# Patient Record
Sex: MALE | Race: WHITE
[De-identification: names, ages, dates, MRNs, and addresses within clinical notes are randomized per-mention and may not be internally consistent; named-entity substitution may affect disease eponyms.]

---

## 2017-10-13 ENCOUNTER — HOSPITAL ENCOUNTER (OUTPATIENT)
Dept: HOSPITAL 47 - LABWHC1 | Age: 62
Discharge: HOME | End: 2017-10-13
Attending: FAMILY MEDICINE
Payer: COMMERCIAL

## 2017-10-13 DIAGNOSIS — E78.5: Primary | ICD-10-CM

## 2017-10-13 DIAGNOSIS — E03.9: ICD-10-CM

## 2017-10-13 LAB
ALP SERPL-CCNC: 85 U/L (ref 38–126)
ALT SERPL-CCNC: 34 U/L (ref 21–72)
ANION GAP SERPL CALC-SCNC: 12 MMOL/L
AST SERPL-CCNC: 17 U/L (ref 17–59)
BUN SERPL-SCNC: 20 MG/DL (ref 9–20)
CALCIUM SPEC-MCNC: 9.1 MG/DL (ref 8.4–10.2)
CHLORIDE SERPL-SCNC: 103 MMOL/L (ref 98–107)
CHOLEST SERPL-MCNC: 167 MG/DL (ref ?–200)
CO2 SERPL-SCNC: 30 MMOL/L (ref 22–30)
GLUCOSE SERPL-MCNC: 104 MG/DL (ref 74–99)
HDLC SERPL-MCNC: 53 MG/DL (ref 40–60)
MAGNESIUM SPEC-SCNC: 2.1 MG/DL (ref 1.6–2.3)
NON-AFRICAN AMERICAN GFR(MDRD): >60
POTASSIUM SERPL-SCNC: 4.2 MMOL/L (ref 3.5–5.1)
PROT SERPL-MCNC: 7.2 G/DL (ref 6.3–8.2)
PSA SERPL-MCNC: 3.05 NG/ML (ref 0–4)
SODIUM SERPL-SCNC: 145 MMOL/L (ref 137–145)

## 2017-10-13 PROCEDURE — 80053 COMPREHEN METABOLIC PANEL: CPT

## 2017-10-13 PROCEDURE — 80061 LIPID PANEL: CPT

## 2017-10-13 PROCEDURE — 84153 ASSAY OF PSA TOTAL: CPT

## 2017-10-13 PROCEDURE — 36415 COLL VENOUS BLD VENIPUNCTURE: CPT

## 2017-10-13 PROCEDURE — 84443 ASSAY THYROID STIM HORMONE: CPT

## 2017-10-13 PROCEDURE — 83735 ASSAY OF MAGNESIUM: CPT

## 2017-10-13 PROCEDURE — 84439 ASSAY OF FREE THYROXINE: CPT

## 2019-09-05 ENCOUNTER — HOSPITAL ENCOUNTER (OUTPATIENT)
Dept: HOSPITAL 47 - RADMRIMAIN | Age: 64
Discharge: HOME | End: 2019-09-05
Attending: OPHTHALMOLOGY
Payer: MEDICARE

## 2019-09-05 DIAGNOSIS — G31.9: Primary | ICD-10-CM

## 2019-09-05 DIAGNOSIS — I67.82: ICD-10-CM

## 2019-09-05 PROCEDURE — 70553 MRI BRAIN STEM W/O & W/DYE: CPT

## 2019-09-05 NOTE — MR
EXAMINATION TYPE: MR brain wo/w con

 

DATE OF EXAM: 9/5/2019

 

COMPARISON: MRI brain July 9, 2015.

 

HISTORY: Diplopia

 

TECHNIQUE: 

Multiplanar, multisequence images of the brain and brainstem is performed without and with IV contras
t, utilizing 10 mL intravenous Gadavist .

 

FINDINGS: Diffusion weighted images demonstrate no evidence of a recent infarct or other diffusion ab
normality.  There is no worrisome extra-axial fluid collection. There is diffuse ventricular and sulc
al prominence redemonstrated. There are some scattered foci of T2 hyperintensities again seen through
out the white matter bilaterally. Less than 10 scattered lesions are seen.

 

Midline structures redemonstrated empty sella morphology.  The craniocervical junction appears within
 normal limits.  Post contrast images demonstrate no abnormal enhancement. The dural venous sinuses a
ppear patent. The visualized sinuses are clear and the globes are intact. Nasal septum remains deviat
ed to right of midline.

 

IMPRESSION: Mild to moderate diffuse cerebral atrophy and mild chronic small vessel ischemic changes.
 No suspicious enhancement noted.

## 2021-08-22 ENCOUNTER — HOSPITAL ENCOUNTER (EMERGENCY)
Dept: HOSPITAL 47 - EC | Age: 66
Discharge: HOME | End: 2021-08-22
Payer: MEDICARE

## 2021-08-22 VITALS — RESPIRATION RATE: 18 BRPM | TEMPERATURE: 98.5 F

## 2021-08-22 VITALS — HEART RATE: 59 BPM | SYSTOLIC BLOOD PRESSURE: 173 MMHG | DIASTOLIC BLOOD PRESSURE: 93 MMHG

## 2021-08-22 DIAGNOSIS — Z79.82: ICD-10-CM
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
## 2021-08-22 DIAGNOSIS — I48.91: ICD-10-CM

## 2021-08-22 DIAGNOSIS — Z79.899: ICD-10-CM

## 2021-08-22 DIAGNOSIS — Z82.49: ICD-10-CM

## 2021-08-22 DIAGNOSIS — E03.9: ICD-10-CM

## 2021-08-22 DIAGNOSIS — Z79.1: ICD-10-CM

## 2021-08-22 DIAGNOSIS — S39.012A: Primary | ICD-10-CM

## 2021-08-22 DIAGNOSIS — Z83.3: ICD-10-CM

## 2021-08-22 DIAGNOSIS — I10: ICD-10-CM

## 2021-08-22 DIAGNOSIS — Z79.890: ICD-10-CM

## 2021-08-22 LAB
PH UR: 7 [PH] (ref 5–8)
SP GR UR: 1.02 (ref 1–1.03)
UROBILINOGEN UR QL STRIP: <2 MG/DL (ref ?–2)

## 2021-08-22 PROCEDURE — 96372 THER/PROPH/DIAG INJ SC/IM: CPT

## 2021-08-22 PROCEDURE — 99283 EMERGENCY DEPT VISIT LOW MDM: CPT

## 2021-08-22 PROCEDURE — 81003 URINALYSIS AUTO W/O SCOPE: CPT

## 2021-08-22 PROCEDURE — 72100 X-RAY EXAM L-S SPINE 2/3 VWS: CPT

## 2021-08-22 NOTE — XR
EXAMINATION TYPE: XR lumbar spine 2 or 3V

 

DATE OF EXAM: 8/22/2021

 

CLINICAL HISTORY: pain

 

TECHNIQUE: Three views of the lumbar spine are submitted.

 

COMPARISON: None.

 

FINDINGS:  

  Curvature convex to the left.  Severe multilevel degenerative disc space narrowing greatest at L5-S
1 and at T12-L1. Chronic loss of height L1. Grade 1 anterolisthesis L5 on S1 measuring 8.6 mm. Severe
 facet joint arthropathy.

 

IMPRESSION:  

Advanced degenerative changes. Grade 1 anterolisthesis L5 on S1. Chronic loss of height L1.

## 2021-08-22 NOTE — ED
General Adult HPI





- General


Chief complaint: Back Pain/Injury


Stated complaint: Back pain/burning


Time Seen by Provider: 21 14:00


Source: patient, RN notes reviewed, old records reviewed


Mode of arrival: wheelchair


Limitations: no limitations





- History of Present Illness


Initial comments: 





This is a 65-year-old male who presents emergency Department stating that on 

Friday he did a lot more work house painting and putting up shelving.  Patient 

states on Saturday morning he woke up and he was having some paraspinous pain on

the left lower back.  Patient states he has no numbness or weakness.  Patient 

denies any urinary continence urinary retention.  Patient denies any abdominal 

pain.  Patient denies any pain on the right side.  Patient states bending or 

twisting makes it worse he states if he goes in the hot tub makes it much 

better.





- Related Data


                                Home Medications











 Medication  Instructions  Recorded  Confirmed


 


Aspirin 81 mg PO DAILY 14


 


Metoprolol Succinate [Toprol XL] 50 mg PO DAILY 14


 


Simvastatin [Zocor] 20 mg PO HS 14


 


Potassium Gluconate [Potassium 99 mg PO DAILY 14





Gluconate ER]   


 


Lisinopril-Hctz 20-25 mg 1 each PO DAILY 14





[Zestoretic 20-25]   


 


Metoprolol Succinate (ER) [Toprol 25 mg PO HS 14





XL]   








                                  Previous Rx's











 Medication  Instructions  Recorded


 


Levothyroxine Sodium [Synthroid] 137 mcg PO DAILY #0 14


 


Cyclobenzaprine [Flexeril] 10 mg PO TID #20 tab 21


 


Ketorolac [Toradol] 10 mg PO Q6HR #15 tab 21











                                    Allergies











Allergy/AdvReac Type Severity Reaction Status Date / Time


 


No Known Allergies Allergy   Verified 14 18:19














Review of Systems


ROS Statement: 


Those systems with pertinent positive or pertinent negative responses have been 

documented in the HPI.





ROS Other: All systems not noted in ROS Statement are negative.





Past Medical History


Past Medical History: Atrial Fibrillation, Hypertension, Prostate Disorder, 

Thyroid Disorder


Additional Past Medical History / Comment(s): FREQUENT PVCS, hypothyroid, 

genital herpes


History of Any Multi-Drug Resistant Organisms: None Reported


Past Surgical History: Heart Catheterization With Stent, Orthopedic Surgery, 

Tonsillectomy


Additional Past Surgical History / Comment(s): knee,shoulder, and wrist


Past Anesthesia/Blood Transfusion Reactions: No Reported Reaction


Date of Last Stent Placement:: 


Past Psychological History: No Psychological Hx Reported


Past Alcohol Use History: None Reported


Past Drug Use History: None Reported





- Past Family History


  ** Mother


Family Medical History: Asthma, COPD, Coronary Artery Disease (CAD), Diabetes 

Mellitus


Additional Family Medical History / Comment(s):  at 75yrs old, polyps in

 colon, colostomy bag





  ** Father


Family Medical History: Myocardial Infarction (MI)


Additional Family Medical History / Comment(s):  with a heart attack





General Exam





- General Exam Comments


Initial Comments: 





GENERAL:


Patient is well-developed and well-nourished.  Patient is nontoxic and well-

hydrated and is in mild distress.





ENT:


Neck is soft and supple.  No significant lymphadenopathy is noted.  Oropharynx 

is clear.  Moist mucous membranes.  Neck has full range of motion without 

eliciting any pain. 





EYES:


The sclera were anicteric and conjunctiva were pink and moist.  Extraocular 

movements were intact and pupils were equal round and reactive to light.  

Eyelids were unremarkable.





ABDOMEN


Soft and nontender with normal bowel sounds. 





SKIN:


Skin is clear with no lesions or rashes and otherwise unremarkable.





NEUROLOGIC:


Patient is alert and oriented x3.  Cranial nerves II through XII are grossly 

intact.  Motor and sensory are also intact.  Normal speech, volume and content. 

 Symmetrical smile.  Straight leg test is negative bilaterally





MUSCULOSKELETAL:


Normal extremities with adequate strength and full range of motion.  Patient has

 some tenderness in the paraspinous muscles on the lower left back. 





PSYCHIATRIC:


Normal psychiatric evaluation. 


Limitations: no limitations





Course





                                   Vital Signs











  21





  12:23


 


Temperature 98.5 F


 


Pulse Rate 61


 


Respiratory 18





Rate 


 


Blood Pressure 163/80


 


O2 Sat by Pulse 97





Oximetry 














Medical Decision Making





- Medical Decision Making





Lumbosacral shows no acute injury.





- Lab Data





                                   Lab Results











  21 Range/Units





  12:34 


 


Urine Color  Yellow  


 


Urine Appearance  Clear  (Clear)  


 


Urine pH  7.0  (5.0-8.0)  


 


Ur Specific Gravity  1.017  (1.001-1.035)  


 


Urine Protein  Negative  (Negative)  


 


Urine Glucose (UA)  Negative  (Negative)  


 


Urine Ketones  Negative  (Negative)  


 


Urine Blood  Negative  (Negative)  


 


Urine Nitrite  Negative  (Negative)  


 


Urine Bilirubin  Negative  (Negative)  


 


Urine Urobilinogen  <2.0  (<2.0)  mg/dL


 


Ur Leukocyte Esterase  Negative  (Negative)  














Disposition


Clinical Impression: 


 Lumbosacral strain





Disposition: HOME SELF-CARE


Condition: Good


Instructions (If sedation given, give patient instructions):  Acute Low Back 

Pain (ED), Low Back Strain (ED)


Prescriptions: 


Cyclobenzaprine [Flexeril] 10 mg PO TID #20 tab


Ketorolac [Toradol] 10 mg PO Q6HR #15 tab


Is patient prescribed a controlled substance at d/c from ED?: No


Referrals: 


Israel Calvert DO [Primary Care Provider] - 1-2 days


Time of Disposition: 14:41

## 2022-05-12 ENCOUNTER — HOSPITAL ENCOUNTER (OUTPATIENT)
Dept: HOSPITAL 47 - RADUSWWP | Age: 67
Discharge: HOME | End: 2022-05-12
Attending: INTERNAL MEDICINE
Payer: MEDICARE

## 2022-05-12 DIAGNOSIS — R10.11: Primary | ICD-10-CM

## 2022-05-12 PROCEDURE — 76700 US EXAM ABDOM COMPLETE: CPT

## 2022-05-12 NOTE — US
EXAMINATION TYPE: US abdomen complete

 

DATE OF EXAM: 5/12/2022

 

COMPARISON: NONE

 

CLINICAL HISTORY: 66-year-old male Rt upper quadrant pain R10.11. Intermittent abdomen pain x 6 month
s

 

TECHNIQUE: Multiple sonographic images of the abdomen are obtained.

 

FINDINGS:

 

EXAM MEASUREMENTS:

 

Liver Length:  14.8 cm   

Gallbladder Wall:  0.3 cm   

CBD:  0.3 cm

Spleen: Unable to adequately visualize due to bowel gas shadowing.

Right Kidney:  9.3 x 4.2 x 4.8 cm 

Left Kidney:  11.7 x 5.0 x 5.2 cm   

 

Sonographer notes:**Difficult and limited due to patient body habitus

 

Pancreas:  limited by overlying midline bowel gas. Most of the body is seen and appears grossly unrem
arkable.

Liver:  Coarse with increased echogenicity. 3.2 x 2.9cm cyst right lobe   

Gallbladder:  wnl

**Evidence for sonographic Clayton's sign:  no

CBD:  visualized portions wnl, limited by overlying bowel gas 

Spleen:  obscured by overlying midline bowel gas   

Right Kidney:  wnl   

Left Kidney:  wnl   

Upper IVC:  wnl  

Abd Aorta:  proximal portion obscured by overlying midline bowel gas, mid and distal portions appear 
wnl

 

 

 

 

 

IMPRESSION: 

1. Echogenic liver parenchyma. Correlate for hepatic steatosis with LFTs, lipid profile, and patient 
risk factors.

2. No gallstones or biliary ductal dilatation.

3. Pancreas and spleen obscured by bowel gas and could not be assessed.

## 2022-07-07 ENCOUNTER — HOSPITAL ENCOUNTER (EMERGENCY)
Dept: HOSPITAL 47 - EC | Age: 67
Discharge: HOME | End: 2022-07-07
Payer: MEDICARE

## 2022-07-07 VITALS — HEART RATE: 59 BPM | DIASTOLIC BLOOD PRESSURE: 68 MMHG | SYSTOLIC BLOOD PRESSURE: 117 MMHG | RESPIRATION RATE: 16 BRPM

## 2022-07-07 VITALS — TEMPERATURE: 98.3 F

## 2022-07-07 DIAGNOSIS — J44.9: ICD-10-CM

## 2022-07-07 DIAGNOSIS — E11.9: ICD-10-CM

## 2022-07-07 DIAGNOSIS — R10.9: ICD-10-CM

## 2022-07-07 DIAGNOSIS — R00.2: Primary | ICD-10-CM

## 2022-07-07 DIAGNOSIS — I10: ICD-10-CM

## 2022-07-07 DIAGNOSIS — E07.9: ICD-10-CM

## 2022-07-07 DIAGNOSIS — Z79.899: ICD-10-CM

## 2022-07-07 LAB
ALBUMIN SERPL-MCNC: 4.4 G/DL (ref 3.5–5)
ALP SERPL-CCNC: 84 U/L (ref 38–126)
ALT SERPL-CCNC: 45 U/L (ref 4–49)
AMYLASE SERPL-CCNC: 59 U/L (ref 30–110)
ANION GAP SERPL CALC-SCNC: 8 MMOL/L
AST SERPL-CCNC: 24 U/L (ref 17–59)
BASOPHILS # BLD AUTO: 0.1 K/UL (ref 0–0.2)
BASOPHILS NFR BLD AUTO: 1 %
BUN SERPL-SCNC: 27 MG/DL (ref 9–20)
CALCIUM SPEC-MCNC: 8.5 MG/DL (ref 8.4–10.2)
CHLORIDE SERPL-SCNC: 99 MMOL/L (ref 98–107)
CO2 SERPL-SCNC: 31 MMOL/L (ref 22–30)
EOSINOPHIL # BLD AUTO: 0.2 K/UL (ref 0–0.7)
EOSINOPHIL NFR BLD AUTO: 1 %
ERYTHROCYTE [DISTWIDTH] IN BLOOD BY AUTOMATED COUNT: 4.95 M/UL (ref 4.3–5.9)
ERYTHROCYTE [DISTWIDTH] IN BLOOD: 12.7 % (ref 11.5–15.5)
GLUCOSE SERPL-MCNC: 106 MG/DL (ref 74–99)
HCT VFR BLD AUTO: 48.3 % (ref 39–53)
HGB BLD-MCNC: 16.6 GM/DL (ref 13–17.5)
LIPASE SERPL-CCNC: 154 U/L (ref 23–300)
LYMPHOCYTES # SPEC AUTO: 2 K/UL (ref 1–4.8)
LYMPHOCYTES NFR SPEC AUTO: 17 %
MCH RBC QN AUTO: 33.5 PG (ref 25–35)
MCHC RBC AUTO-ENTMCNC: 34.3 G/DL (ref 31–37)
MCV RBC AUTO: 97.7 FL (ref 80–100)
MONOCYTES # BLD AUTO: 0.8 K/UL (ref 0–1)
MONOCYTES NFR BLD AUTO: 6 %
NEUTROPHILS # BLD AUTO: 8.9 K/UL (ref 1.3–7.7)
NEUTROPHILS NFR BLD AUTO: 73 %
PH UR: 6.5 [PH] (ref 5–8)
PLATELET # BLD AUTO: 131 K/UL (ref 150–450)
POTASSIUM SERPL-SCNC: 3.5 MMOL/L (ref 3.5–5.1)
PROT SERPL-MCNC: 7.4 G/DL (ref 6.3–8.2)
SODIUM SERPL-SCNC: 138 MMOL/L (ref 137–145)
SP GR UR: 1.02 (ref 1–1.03)
UROBILINOGEN UR QL STRIP: <2 MG/DL (ref ?–2)
WBC # BLD AUTO: 12.2 K/UL (ref 3.8–10.6)

## 2022-07-07 PROCEDURE — 81003 URINALYSIS AUTO W/O SCOPE: CPT

## 2022-07-07 PROCEDURE — 74177 CT ABD & PELVIS W/CONTRAST: CPT

## 2022-07-07 PROCEDURE — 76770 US EXAM ABDO BACK WALL COMP: CPT

## 2022-07-07 PROCEDURE — 96376 TX/PRO/DX INJ SAME DRUG ADON: CPT

## 2022-07-07 PROCEDURE — 83690 ASSAY OF LIPASE: CPT

## 2022-07-07 PROCEDURE — 71045 X-RAY EXAM CHEST 1 VIEW: CPT

## 2022-07-07 PROCEDURE — 74018 RADEX ABDOMEN 1 VIEW: CPT

## 2022-07-07 PROCEDURE — 99285 EMERGENCY DEPT VISIT HI MDM: CPT

## 2022-07-07 PROCEDURE — 80053 COMPREHEN METABOLIC PANEL: CPT

## 2022-07-07 PROCEDURE — 96375 TX/PRO/DX INJ NEW DRUG ADDON: CPT

## 2022-07-07 PROCEDURE — 82150 ASSAY OF AMYLASE: CPT

## 2022-07-07 PROCEDURE — 36415 COLL VENOUS BLD VENIPUNCTURE: CPT

## 2022-07-07 PROCEDURE — 96374 THER/PROPH/DIAG INJ IV PUSH: CPT

## 2022-07-07 PROCEDURE — 84484 ASSAY OF TROPONIN QUANT: CPT

## 2022-07-07 PROCEDURE — 85025 COMPLETE CBC W/AUTO DIFF WBC: CPT

## 2022-07-07 PROCEDURE — 83605 ASSAY OF LACTIC ACID: CPT

## 2022-07-07 PROCEDURE — 93005 ELECTROCARDIOGRAM TRACING: CPT

## 2022-07-07 PROCEDURE — 96361 HYDRATE IV INFUSION ADD-ON: CPT

## 2022-07-07 NOTE — CT
EXAMINATION TYPE: CT abdomen pelvis w con

 

DATE OF EXAM: 7/7/2022

 

COMPARISON: None

 

INDICATION: Abdominal pain

 

DLP: 1608 mGycm, Automated exposure control for dose reduction was used.

 

CONTRAST:  70 ml mL of Isovue 300. 

                        Study performed without Oral Contrast

 

TECHNIQUE: Axial images were obtained from above the diaphragm to the pubic rami in the axial plane a
t 5 mm thick sections.  Reconstructed images are reviewed on the computer in the coronal plane.  

 

FINDINGS:

 

Limited CT sections are obtained the lung bases.  There is some mild thickening along left major fiss
ure inferiorly. This could be related to fluid. Coronary artery calcification is noted..

 

CT ABDOMEN:

 

Liver: There is a cyst at the inferior right tip of the liver.

 

Spleen: Normal

 

Pancreas: Normal

 

Adrenal glands: The adrenal glands are normal.

 

Gallbladder: Normal  

 

Kidneys: No masses are evident. No hydronephrosis is present.   There is a 0.8 cm cyst in the anterio
r medial lower left kidney cortex.

 

Aorta: Vascular calcification is within the aorta.  There is fusiform prominence of the distal abdomi
nal aorta at the bifurcation. Iliac vessels appear normal

 

Inferior vena cava: Normal.

 

CT PELVIS: Right inguinal hernia containing mesenteric fat is evident.

Multiple diverticular changes are within sigmoid colon. There are loops of bowel which are incomplete
ly distended or lack oral contrast limiting their evaluation.

 

Appendix: Normal as visualized.

 

Urinary bladder: Normal. 

 

Genitourinary structures: Prostate appears somewhat prominent.

 

Osseous structures: No suspicious lytic or sclerotic lesions.

 

IMPRESSIONS:

1.  Left renal cyst.

2. Right inguinal hernia containing mesenteric fat.

3. Mild fusiform prominence distal abdominal aorta at the bifurcation

## 2022-07-07 NOTE — ED
General Adult HPI





- General


Chief complaint: Arrhythmia/Palpitations


Stated complaint: Back pain/heart flutter


Time Seen by Provider: 22 10:54


Source: patient, RN notes reviewed, old records reviewed


Mode of arrival: ambulatory


Limitations: no limitations





- History of Present Illness


Initial comments: 





Patient is a 66-year-old male with past medical history remarkable for 

hypertension, prostate disorder, thyroid disorder, atrial fibrillation/flutter 

who presents emergency Department complaining of persistent left-sided flank 

pain as well as a feeling of heart palpitations earlier today.  States that the 

palpitations began last night earlier this morning.  They have resolved.  He is 

uncertain if it is related to the pain.  Denied any chest pain, shortness 

breath.  Currently is not having them.  Does endorse some left-sided flank pain 

which feels a burning sensation that radiates from the left posterior axillary 

line near the left CVA around to the anterior abdomen.  Prescription it is 

burning.  Relieved when pressure is placed on it.  Worse when standing.  Denies 

any other acute complaints including nausea, vomiting, diarrhea.  Denies any 

urinary complaints.  Is uncertain what is causing his current pain.  Was seen by

PCP and started on muscle relaxers and steroids with minimal improvement.  

Presents for further evaluation at this time.  Denies any new trauma.  Does have

a follow-up appointment with Dr. Michaud at the end of this month.





- Related Data


                                Home Medications











 Medication  Instructions  Recorded  Confirmed


 


Aspirin 81 mg PO DAILY 14


 


Metoprolol Succinate [Toprol XL] 50 mg PO DAILY 14


 


Simvastatin [Zocor] 20 mg PO HS 14


 


Potassium Gluconate [Potassium 99 mg PO DAILY 14





Gluconate ER]   


 


Lisinopril-Hctz 20-25 mg 1 each PO DAILY 14





[Zestoretic 20-25]   


 


Metoprolol Succinate (ER) [Toprol 25 mg PO HS 14





XL]   








                                  Previous Rx's











 Medication  Instructions  Recorded


 


Levothyroxine Sodium [Synthroid] 137 mcg PO DAILY #0 14


 


Cyclobenzaprine [Flexeril] 10 mg PO TID #20 tab 21


 


Ketorolac [Toradol] 10 mg PO Q6HR #15 tab 21


 


Gabapentin [Neurontin] 300 mg PO Q8HR PRN 7 Days #21 cap 22


 


Lidocaine 5% Patch [Lidoderm 5% 1 patch TOPICAL DAILY PRN 7 Days 22





Patch] #7 patch 


 


methocarbamoL [Robaxin] 1,000 mg PO BID PRN 7 Days #14 tab 22











                                    Allergies











Allergy/AdvReac Type Severity Reaction Status Date / Time


 


No Known Allergies Allergy   Verified 22 10:52














Review of Systems


ROS Statement: 


Those systems with pertinent positive or pertinent negative responses have been 

documented in the HPI.


Review of Systems:


CONST: Denies fever 


EYES: Denies blurry vision 


ENT: Denies nasal congestion  


C/V:  Denies Chest pain


RESP: Denies shortness of breath 


GI: Endorses right sided flank pain/abdominal pain


: Denies dysuria  


SKIN: Denies rash.


MSK: Denies joint pain.


NEURO: Denies headache 


ROS Other: All systems not noted in ROS Statement are negative.





Past Medical History


Past Medical History: Atrial Fibrillation, Hypertension, Prostate Disorder, 

Thyroid Disorder


Additional Past Medical History / Comment(s): FREQUENT PVCS, hypothyroid, nicholas

poli herpes


History of Any Multi-Drug Resistant Organisms: None Reported


Past Surgical History: Heart Catheterization With Stent, Orthopedic Surgery, T

onsillectomy


Additional Past Surgical History / Comment(s): knee,shoulder, and wrist


Past Anesthesia/Blood Transfusion Reactions: No Reported Reaction


Date of Last Stent Placement:: 


Past Psychological History: No Psychological Hx Reported


Past Alcohol Use History: None Reported


Past Drug Use History: None Reported





- Past Family History


  ** Mother


Family Medical History: Asthma, COPD, Coronary Artery Disease (CAD), Diabetes 

Mellitus


Additional Family Medical History / Comment(s):  at 75yrs old, polyps in

 colon, colostomy bag





  ** Father


Family Medical History: Myocardial Infarction (MI)


Additional Family Medical History / Comment(s):  with a heart attack





General Exam





- General Exam Comments


Initial Comments: 





General: Appears in no acute distress.


HEAD:  Normal with no signs of head trauma.


EYES:  PERRLA, EOMI, conjunctiva normal, no discharge.


ENT:  Hearing grossly intact, normal oropharynx.


RESPIRATORY:  Clear breath sounds bilaterally.  No wheezes, rales, or rhonchi.  


C/V:  Regular rate and rhythm. S1 and S2 auscultated, no edema, peripheral 

pulses 2+ and intact throughout


ABD: Abdomen is soft, nondistended.  Mild tenderness to palpation over the left 

side and left flank.  No CVA tenderness to percussion.  No guarding.  No rebound

 tenderness.  Pain radiates to left mid quadrant.


EXT: Normal range of motion, no obvious deformity


SKIN:  No rashes or lesions observed on exposed skin.


NEURO: Alert and oriented x 4.  Cranial nerves II-XII intact. No focal sensory 

or strength deficits.


Limitations: no limitations





Course


                                   Vital Signs











  22





  10:48 14:00


 


Temperature 98.3 F 


 


Pulse Rate 83 59 L


 


Respiratory 18 16





Rate  


 


Blood Pressure 104/77 117/68


 


O2 Sat by Pulse 99 95





Oximetry  














Medical Decision Making





- Medical Decision Making





Based on the patient's presentation and physical exam, I'm concerned for acute 

intra-abdominal process for his current symptoms.  Also be a muscle strain.  The

 burning sensation as well as the radiation of the pain could be related to a 

radiculopathy as well.  However cannot rule out nephrolithiasis.  We will obtain

 basic laboratory studies to begin as well as an abdominal and chest x-ray and 

renal ultrasound.  He'll be symptomatically treated.  Patient was in agreement 

this plan.





EKG shows no signs of acute ischemia.  Lab studies are remarkable for a mild 

leukocytosis of 12.2.  Troponin is undetectable.  Urine is clear without blood. 

 KUB revealed possible underlying ileus or enteritis, but difficult exam.  Chest

 x-ray reveals no acute process.  They documented as possible prominence of the 

aortic knob but on prior chest x-ray, there is no difference from 2014.





I discussed with the patient results of his laboratory studies and imaging.  

Recommended CT of the pelvis to exclude intra-abdominal process.  Patient was in

 agreement this plan.  Was redosed pain medications at this time.  Vital signs 

remained within normal limits and stable.  CT revealed left renal cysts, right 

inguinal hernia containing mesenteric fat, as well as mild fusiform prominence 

of the distal abdominal aorta at the bifurcation.





I discussed the findings with the patient.  I informed him of the slight 

fusiform dilation of the aorta, and recommended repeat imaging outpatient 

throughout aortic aneurysm.  He stressed understanding.  Expressed understanding

 of findings of the cyst as well as the hernia which is known to him.  We 

discussed at length that his symptoms are likely secondary to muscular skeletal 

in nature.  I do not believe he requires admission at this time.  Will be given 

all medications to manage them.  Will be trialed on a one-week supply of 

gabapentin.  He was in agreement this plan.  Already has follow-up arranged with

 orthopedic spine specialist, Dr. Michaud.  Patient was in agreement with this 

plan.  Vital signs remained within normal limits at this time.  I stressed to 

him that I am not diagnosing him with a aortic abdominal aneurysm, however we'll

 provide him with information on it and his discharge paperwork.





I will provide the patient with a prescription for Robaxin, lidocaine patches, 

gabapentin for one week. I instructed the patient to follow up with their PCP in

 the next 3 days.  I explained that the patient should return to the emergency 

department if they experience any worsening symptoms. Strict return precautions 

were discussed with the patient. The patient expressed understanding of these 

instructions. I answered all questions that the patient had. The patient was 

discharged home in fair condition with their prescriptions and follow up 

information.





- Lab Data


Result diagrams: 


                                 22 12:01





                                 22 13:30


                                   Lab Results











  22 Range/Units





  12:00 12:01 13:30 


 


WBC   12.2 H   (3.8-10.6)  k/uL


 


RBC   4.95   (4.30-5.90)  m/uL


 


Hgb   16.6   (13.0-17.5)  gm/dL


 


Hct   48.3   (39.0-53.0)  %


 


MCV   97.7   (80.0-100.0)  fL


 


MCH   33.5   (25.0-35.0)  pg


 


MCHC   34.3   (31.0-37.0)  g/dL


 


RDW   12.7   (11.5-15.5)  %


 


Plt Count   131 L   (150-450)  k/uL


 


MPV   11.5   


 


Neutrophils %   73   %


 


Lymphocytes %   17   %


 


Monocytes %   6   %


 


Eosinophils %   1   %


 


Basophils %   1   %


 


Neutrophils #   8.9 H   (1.3-7.7)  k/uL


 


Lymphocytes #   2.0   (1.0-4.8)  k/uL


 


Monocytes #   0.8   (0-1.0)  k/uL


 


Eosinophils #   0.2   (0-0.7)  k/uL


 


Basophils #   0.1   (0-0.2)  k/uL


 


Sodium     (137-145)  mmol/L


 


Potassium     (3.5-5.1)  mmol/L


 


Chloride     ()  mmol/L


 


Carbon Dioxide     (22-30)  mmol/L


 


Anion Gap     mmol/L


 


BUN     (9-20)  mg/dL


 


Creatinine     (0.66-1.25)  mg/dL


 


Est GFR (CKD-EPI)AfAm     (>60 ml/min/1.73 sqM)  


 


Est GFR (CKD-EPI)NonAf     (>60 ml/min/1.73 sqM)  


 


Glucose     (74-99)  mg/dL


 


Plasma Lactic Acid Kaden    1.6  (0.7-2.0)  mmol/L


 


Calcium     (8.4-10.2)  mg/dL


 


Total Bilirubin     (0.2-1.3)  mg/dL


 


AST     (17-59)  U/L


 


ALT     (4-49)  U/L


 


Alkaline Phosphatase     ()  U/L


 


Troponin I     (0.000-0.034)  ng/mL


 


Total Protein     (6.3-8.2)  g/dL


 


Albumin     (3.5-5.0)  g/dL


 


Amylase     ()  U/L


 


Lipase     ()  U/L


 


Urine Color  Yellow    


 


Urine Appearance  Clear    (Clear)  


 


Urine pH  6.5    (5.0-8.0)  


 


Ur Specific Gravity  1.017    (1.001-1.035)  


 


Urine Protein  Negative    (Negative)  


 


Urine Glucose (UA)  Negative    (Negative)  


 


Urine Ketones  Negative    (Negative)  


 


Urine Blood  Negative    (Negative)  


 


Urine Nitrite  Negative    (Negative)  


 


Urine Bilirubin  Negative    (Negative)  


 


Urine Urobilinogen  <2.0    (<2.0)  mg/dL


 


Ur Leukocyte Esterase  Negative    (Negative)  














  22 Range/Units





  13:30 13:30 


 


WBC    (3.8-10.6)  k/uL


 


RBC    (4.30-5.90)  m/uL


 


Hgb    (13.0-17.5)  gm/dL


 


Hct    (39.0-53.0)  %


 


MCV    (80.0-100.0)  fL


 


MCH    (25.0-35.0)  pg


 


MCHC    (31.0-37.0)  g/dL


 


RDW    (11.5-15.5)  %


 


Plt Count    (150-450)  k/uL


 


MPV    


 


Neutrophils %    %


 


Lymphocytes %    %


 


Monocytes %    %


 


Eosinophils %    %


 


Basophils %    %


 


Neutrophils #    (1.3-7.7)  k/uL


 


Lymphocytes #    (1.0-4.8)  k/uL


 


Monocytes #    (0-1.0)  k/uL


 


Eosinophils #    (0-0.7)  k/uL


 


Basophils #    (0-0.2)  k/uL


 


Sodium  138   (137-145)  mmol/L


 


Potassium  3.5   (3.5-5.1)  mmol/L


 


Chloride  99   ()  mmol/L


 


Carbon Dioxide  31 H   (22-30)  mmol/L


 


Anion Gap  8   mmol/L


 


BUN  27 H   (9-20)  mg/dL


 


Creatinine  1.07   (0.66-1.25)  mg/dL


 


Est GFR (CKD-EPI)AfAm  83   (>60 ml/min/1.73 sqM)  


 


Est GFR (CKD-EPI)NonAf  72   (>60 ml/min/1.73 sqM)  


 


Glucose  106 H   (74-99)  mg/dL


 


Plasma Lactic Acid Kaden    (0.7-2.0)  mmol/L


 


Calcium  8.5   (8.4-10.2)  mg/dL


 


Total Bilirubin  0.5   (0.2-1.3)  mg/dL


 


AST  24   (17-59)  U/L


 


ALT  45   (4-49)  U/L


 


Alkaline Phosphatase  84   ()  U/L


 


Troponin I   <0.012  (0.000-0.034)  ng/mL


 


Total Protein  7.4   (6.3-8.2)  g/dL


 


Albumin  4.4   (3.5-5.0)  g/dL


 


Amylase  59   ()  U/L


 


Lipase  154   ()  U/L


 


Urine Color    


 


Urine Appearance    (Clear)  


 


Urine pH    (5.0-8.0)  


 


Ur Specific Gravity    (1.001-1.035)  


 


Urine Protein    (Negative)  


 


Urine Glucose (UA)    (Negative)  


 


Urine Ketones    (Negative)  


 


Urine Blood    (Negative)  


 


Urine Nitrite    (Negative)  


 


Urine Bilirubin    (Negative)  


 


Urine Urobilinogen    (<2.0)  mg/dL


 


Ur Leukocyte Esterase    (Negative)  














- EKG Data


-: EKG Interpreted by Me


EKG Comments: 





12-lead Electrocardiogram Interpretation Note





EKG was reviewed and interpreted by myself. 12-lead ECG performed at 12:15 is 

interpreted by me as revealing normal sinus rhythm at a rate of 82 beats per 

minute.  Axis is normal. OK intervals 186 ms, QRS duration is 108 ms, QTc is 399

 ms..  There were no ST or T wave abnormalities to suggest myocardial ischemia 

or injury. R wave progression across the precordium was satisfactory. By my 

interpretation this EKG is non-diagnostic for acute ischemia.





Disposition


Clinical Impression: 


 Heart palpitations, Abdominal pain of unknown etiology, Muscle strain





Disposition: HOME SELF-CARE


Condition: Fair


Instructions (If sedation given, give patient instructions):  Nonruptured 

Abdominal Aortic Aneurysm (DC), Abdominal Pain (ED)


Additional Instructions: 


You have early possible signs of an abdominal aortic aneurysm. Follow up for 

repeat imaging in 6-12 months. Discuss with your PCP.


Prescriptions: 


Lidocaine 5% Patch [Lidoderm 5% Patch] 1 patch TOPICAL DAILY PRN 7 Days #7 patch


 PRN Reason: Pain


Gabapentin [Neurontin] 300 mg PO Q8HR PRN 7 Days #21 cap


 PRN Reason: Pain


methocarbamoL [Robaxin] 1,000 mg PO BID PRN 7 Days #14 tab


 PRN Reason: Pain


Is patient prescribed a controlled substance at d/c from ED?: Yes


When asked, does pt state using other controlled substances?: No


Referrals: 


Israel Calvert DO [Primary Care Provider] - 1-2 days


Time of Disposition: 14:50

## 2022-07-07 NOTE — US
EXAMINATION TYPE: US renals and bladder

 

DATE OF EXAM: 7/7/2022

 

COMPARISON: US 2022

 

CLINICAL HISTORY: eval for hydronephrosis. Left flank pain x couple weeks

 

EXAM MEASUREMENTS:

 

Right Kidney:  11.2 x 5.0 x 5.3 cm

Left Kidney: 12.0 x 5.7 x 4.8 cm

 

**Difficult and limited study due to patient body habitus

 

Right Kidney: 2.1cm cystic area medial mid pole, probable extrarenal pelvis  is noted.

Left Kidney: wnl  

Bladder: wnl

**Bilateral Jets seen: yes

 

There is no evidence for hydronephrosis at this point in time.  No nephrolithiasis is seen.  Cortical
 medullary differentiation is maintained. Extrarenal pelvis suspected on the right. No masses are andrea
ntified.  The urinary bladder is anechoic. Inferior impression on the bladder due to the prostate is 
noted. Bilateral ureteral jets are seen.

 

 

 

IMPRESSION:

No evident hydronephrosis. Findings thought to represent an extrarenal pelvis on the right. Additiona
l findings above.

## 2022-07-07 NOTE — XR
EXAMINATION TYPE: XR chest 1V portable

 

DATE OF EXAM: 7/7/2022

 

COMPARISON: Chest x-ray dated 12/7/2014

 

HISTORY: Abdominal pain

 

TECHNIQUE: Single frontal view of the chest is obtained.

 

FINDINGS:  There is no focal air space opacity, pleural effusion, or pneumothorax seen.  The cardiac 
silhouette size is within normal limits. Prominence of the aortic knob is present, the aorta is dense
.  The osseous structures are intact, there is thoracic spondylosis.

 

IMPRESSION:  No acute process. Possible aortic aneurysm.

## 2022-07-07 NOTE — XR
KUB

 

HISTORY: Left-sided abdominal pain

 

Frontal KUB and 2 images

 

There is no evident bowel obstruction or pneumoperitoneum. There is a scoliotic curvature of the spin
e, thoracic lumbar spondylosis is present. Vascular calcifications are present. Suspect some arthropa
thy in the hips. Air-fluid levels without bowel distention noted. Lung bases are remarkable for some 
questionable increased attenuation at the left costophrenic angle.

 

IMPRESSION: There could be underlying ileus or enteritis, follow-up as indicated. Difficult to exclud
e airspace disease at the left costophrenic angle.

## 2023-08-22 ENCOUNTER — HOSPITAL ENCOUNTER (INPATIENT)
Dept: HOSPITAL 47 - EC | Age: 68
LOS: 2 days | Discharge: HOME | DRG: 310 | End: 2023-08-24
Attending: INTERNAL MEDICINE | Admitting: INTERNAL MEDICINE
Payer: MEDICARE

## 2023-08-22 VITALS — RESPIRATION RATE: 18 BRPM

## 2023-08-22 DIAGNOSIS — I49.3: ICD-10-CM

## 2023-08-22 DIAGNOSIS — Z86.16: ICD-10-CM

## 2023-08-22 DIAGNOSIS — E03.9: ICD-10-CM

## 2023-08-22 DIAGNOSIS — E78.5: ICD-10-CM

## 2023-08-22 DIAGNOSIS — Z79.01: ICD-10-CM

## 2023-08-22 DIAGNOSIS — I10: ICD-10-CM

## 2023-08-22 DIAGNOSIS — Z79.890: ICD-10-CM

## 2023-08-22 DIAGNOSIS — Z82.49: ICD-10-CM

## 2023-08-22 DIAGNOSIS — I44.0: ICD-10-CM

## 2023-08-22 DIAGNOSIS — I49.1: ICD-10-CM

## 2023-08-22 DIAGNOSIS — Z09: ICD-10-CM

## 2023-08-22 DIAGNOSIS — Z79.899: ICD-10-CM

## 2023-08-22 DIAGNOSIS — Z82.5: ICD-10-CM

## 2023-08-22 DIAGNOSIS — I48.0: ICD-10-CM

## 2023-08-22 DIAGNOSIS — Z79.82: ICD-10-CM

## 2023-08-22 DIAGNOSIS — I48.3: Primary | ICD-10-CM

## 2023-08-22 LAB
ALBUMIN SERPL-MCNC: 4.1 G/DL (ref 3.5–5)
ALP SERPL-CCNC: 74 U/L (ref 38–126)
ALT SERPL-CCNC: 25 U/L (ref 4–49)
ANION GAP SERPL CALC-SCNC: 9 MMOL/L
APTT BLD: 23.1 SEC (ref 22–30)
AST SERPL-CCNC: 28 U/L (ref 17–59)
BASOPHILS # BLD AUTO: 0 K/UL (ref 0–0.2)
BASOPHILS NFR BLD AUTO: 0 %
BUN SERPL-SCNC: 22 MG/DL (ref 9–20)
CALCIUM SPEC-MCNC: 8.8 MG/DL (ref 8.4–10.2)
CHLORIDE SERPL-SCNC: 103 MMOL/L (ref 98–107)
CO2 SERPL-SCNC: 28 MMOL/L (ref 22–30)
EOSINOPHIL # BLD AUTO: 0.1 K/UL (ref 0–0.7)
EOSINOPHIL NFR BLD AUTO: 2 %
ERYTHROCYTE [DISTWIDTH] IN BLOOD BY AUTOMATED COUNT: 4.08 M/UL (ref 4.3–5.9)
ERYTHROCYTE [DISTWIDTH] IN BLOOD: 12.7 % (ref 11.5–15.5)
GLUCOSE SERPL-MCNC: 129 MG/DL (ref 74–99)
HCT VFR BLD AUTO: 40 % (ref 39–53)
HGB BLD-MCNC: 13.6 GM/DL (ref 13–17.5)
INR PPP: 1.1 (ref ?–1.2)
LYMPHOCYTES # SPEC AUTO: 1.9 K/UL (ref 1–4.8)
LYMPHOCYTES NFR SPEC AUTO: 22 %
MAGNESIUM SPEC-SCNC: 2.1 MG/DL (ref 1.6–2.3)
MCH RBC QN AUTO: 33.2 PG (ref 25–35)
MCHC RBC AUTO-ENTMCNC: 33.9 G/DL (ref 31–37)
MCV RBC AUTO: 98 FL (ref 80–100)
MONOCYTES # BLD AUTO: 0.5 K/UL (ref 0–1)
MONOCYTES NFR BLD AUTO: 6 %
NEUTROPHILS # BLD AUTO: 5.7 K/UL (ref 1.3–7.7)
NEUTROPHILS NFR BLD AUTO: 68 %
PLATELET # BLD AUTO: 139 K/UL (ref 150–450)
POTASSIUM SERPL-SCNC: 3.6 MMOL/L (ref 3.5–5.1)
PROT SERPL-MCNC: 7.1 G/DL (ref 6.3–8.2)
PT BLD: 11.5 SEC (ref 9–12)
SODIUM SERPL-SCNC: 140 MMOL/L (ref 137–145)
T4 FREE SERPL-MCNC: 1.08 NG/DL (ref 0.78–2.19)
WBC # BLD AUTO: 8.4 K/UL (ref 3.8–10.6)

## 2023-08-22 PROCEDURE — 80053 COMPREHEN METABOLIC PANEL: CPT

## 2023-08-22 PROCEDURE — 83735 ASSAY OF MAGNESIUM: CPT

## 2023-08-22 PROCEDURE — 93306 TTE W/DOPPLER COMPLETE: CPT

## 2023-08-22 PROCEDURE — 84443 ASSAY THYROID STIM HORMONE: CPT

## 2023-08-22 PROCEDURE — 84484 ASSAY OF TROPONIN QUANT: CPT

## 2023-08-22 PROCEDURE — 85730 THROMBOPLASTIN TIME PARTIAL: CPT

## 2023-08-22 PROCEDURE — 80061 LIPID PANEL: CPT

## 2023-08-22 PROCEDURE — 84481 FREE ASSAY (FT-3): CPT

## 2023-08-22 PROCEDURE — 85025 COMPLETE CBC W/AUTO DIFF WBC: CPT

## 2023-08-22 PROCEDURE — 96366 THER/PROPH/DIAG IV INF ADDON: CPT

## 2023-08-22 PROCEDURE — 99291 CRITICAL CARE FIRST HOUR: CPT

## 2023-08-22 PROCEDURE — 71046 X-RAY EXAM CHEST 2 VIEWS: CPT

## 2023-08-22 PROCEDURE — 84439 ASSAY OF FREE THYROXINE: CPT

## 2023-08-22 PROCEDURE — 85610 PROTHROMBIN TIME: CPT

## 2023-08-22 PROCEDURE — 96375 TX/PRO/DX INJ NEW DRUG ADDON: CPT

## 2023-08-22 PROCEDURE — 93005 ELECTROCARDIOGRAM TRACING: CPT

## 2023-08-22 PROCEDURE — 96368 THER/DIAG CONCURRENT INF: CPT

## 2023-08-22 PROCEDURE — 94760 N-INVAS EAR/PLS OXIMETRY 1: CPT

## 2023-08-22 PROCEDURE — 85049 AUTOMATED PLATELET COUNT: CPT

## 2023-08-22 PROCEDURE — 96365 THER/PROPH/DIAG IV INF INIT: CPT

## 2023-08-22 PROCEDURE — 36415 COLL VENOUS BLD VENIPUNCTURE: CPT

## 2023-08-22 NOTE — XR
EXAMINATION TYPE: XR chest 2V

 

DATE OF EXAM: 8/22/2023

 

COMPARISON: 11/7/2022

 

HISTORY: Shortness of breath

 

TECHNIQUE:  Frontal and lateral views of the chest are obtained.

 

FINDINGS:

 

Scattered senescent parenchymal changes noted. Hyperinflation compatible with COPD. 

 

No evidence for infiltrate. No evidence for atelectasis.

 

Heart size is stable.

 

Mediastinal structures are stable and grossly unremarkable.

 

No evidence for hilar prominence.

 

Degenerative changes dorsal spine. 

 

IMPRESSION:

1. No evidence for acute pulmonary disease.

## 2023-08-22 NOTE — ED
General Adult HPI





- General


Chief complaint: Arrhythmia/Palpitations


Stated complaint: Palpitations


Time Seen by Provider: 23 18:53


Source: patient, family, RN notes reviewed


Mode of arrival: ambulatory


Limitations: no limitations





- History of Present Illness


Initial comments: 





Patient is a pleasant [age]-year-old male presenting to the emergency 

department with concerns of palpitations.  Onset of symptoms was a past couple 

of hours.  Patient does have history of similar symptoms previously associated 

with atrial flutter.  Patient is not currently on any anticoagulation.  Patient 

felt his heart was going fast earlier.  Now patient just feels that his heart is

regular.  No chest pain.  No dyspnea.





- Related Data


                                Home Medications











 Medication  Instructions  Recorded  Confirmed


 


Aspirin 81 mg PO DAILY 14


 


Metoprolol Succinate [Toprol XL] 50 mg PO DAILY 14


 


Simvastatin [Zocor] 20 mg PO HS 14


 


Potassium Gluconate [Potassium 99 mg PO DAILY 14





Gluconate ER]   


 


Lisinopril-Hctz 20-25 mg 1 each PO DAILY 14





[Zestoretic 20-25]   


 


Metoprolol Succinate (ER) [Toprol 25 mg PO HS 14





XL]   








                                  Previous Rx's











 Medication  Instructions  Recorded


 


Levothyroxine Sodium [Synthroid] 137 mcg PO DAILY #0 14


 


Cyclobenzaprine [Flexeril] 10 mg PO TID #20 tab 21


 


Ketorolac [Toradol] 10 mg PO Q6HR #15 tab 21


 


Gabapentin [Neurontin] 300 mg PO Q8HR PRN 7 Days #21 cap 22


 


Lidocaine 5% Patch [Lidoderm 5% 1 patch TOPICAL DAILY PRN 7 Days 22





Patch] #7 patch 


 


methocarbamoL [Robaxin] 1,000 mg PO BID PRN 7 Days #14 tab 22











                                    Allergies











Allergy/AdvReac Type Severity Reaction Status Date / Time


 


No Known Allergies Allergy   Verified 22 10:52














Review of Systems


ROS Statement: 


Those systems with pertinent positive or pertinent negative responses have been 

documented in the HPI.





ROS Other: All systems not noted in ROS Statement are negative.


Constitutional: Denies: fever


Eyes: Denies: eye pain


ENT: Denies: ear pain


Respiratory: Denies: cough


Cardiovascular: Reports: as per HPI, palpitations.  Denies: chest pain


Endocrine: Denies: fatigue


Gastrointestinal: Denies: abdominal pain


Genitourinary: Denies: dysuria





Past Medical History


Past Medical History: Atrial Fibrillation, Hypertension, Prostate Disorder, 

Thyroid Disorder


Additional Past Medical History / Comment(s): FREQUENT PVCS, hypothyroid, 

genital herpes


History of Any Multi-Drug Resistant Organisms: None Reported


Past Surgical History: Heart Catheterization With Stent, Orthopedic Surgery, 

Tonsillectomy


Additional Past Surgical History / Comment(s): knee,shoulder, and wrist


Past Anesthesia/Blood Transfusion Reactions: No Reported Reaction


Date of Last Stent Placement:: 


Past Psychological History: No Psychological Hx Reported


Past Alcohol Use History: None Reported


Past Drug Use History: None Reported





- Past Family History


  ** Mother


Family Medical History: Asthma, COPD, Coronary Artery Disease (CAD), Diabetes 

Mellitus


Additional Family Medical History / Comment(s):  at 75yrs old, polyps in

 colon, colostomy bag





  ** Father


Family Medical History: Myocardial Infarction (MI)


Additional Family Medical History / Comment(s):  with a heart attack





General Exam


Limitations: no limitations


General appearance: alert, in no apparent distress


Head exam: Present: normocephalic


Eye exam: Present: normal appearance


Neck exam: Present: normal inspection


Respiratory exam: Present: normal lung sounds bilaterally


Cardiovascular Exam: Present: tachycardia, irregular rhythm, normal heart sounds


  ** Expanded


Peripheral pulses: 2+: Radial (R), Radial (L), Posterior Tibialis (R), Posterior

 Tibialis (L)


GI/Abdominal exam: Present: soft.  Absent: tenderness


Extremities exam: Present: normal inspection.  Absent: pedal edema, calf 

tenderness


Neurological exam: Present: alert


Psychiatric exam: Present: normal affect, normal mood


Skin exam: Present: normal color





Course


                                   Vital Signs











  23





  18:47


 


Temperature 98 F


 


Pulse Rate 59 L


 


Respiratory 18





Rate 


 


Blood Pressure 109/68


 


O2 Sat by Pulse 96





Oximetry 














EKG Findings





- EKG Results:


EKG: interpreted by ERMD (Left axis.  Inferior Q waves.), normal ST/T


EKG shows: tachycardia, atrial fibrillation





Medical Decision Making





- Medical Decision Making





Was pt. sent in by a medical professional or institution (, PA, NP, urgent 

care, hospital, or nursing home...) When possible be specific


@  -No


Did you speak to anyone other than the patient for history (EMS, parent, family,

 police, friend...)? What history was obtained from this source 


@  -No


Did you review nursing and triage notes (agree or disagree)?  Why? 


@  -I reviewed and agree with nursing and triage notes


Were old charts reviewed (outside hosp., previous admission, EMS record, old 

EKG, old radiological studies, urgent care reports/EKG's, nursing home records)?

 Report findings 


@  -No old charts were reviewed


Differential Diagnosis (chest pain, altered mental status, abdominal pain women,

 abdominal pain men, vaginal bleeding, weakness, fever, dyspnea, syncope, 

headache, dizziness, GI bleed, back pain, seizure, CVA, palpatations, mental 

health, musculoskeletal)? 


@  -Differential Palpitations


Ventricular arrhythmias, atrial arrhythmias, myocardial infarction, anemia, 

thyrotoxicosis, electrolyte imbalance, hypokalemia, pulmonary embolism, 

pulmonary disease, drugs, alcohol, anxiety, stress....


This is not meant to be an all-inclusive list.


EKG interpreted by me (3pts min.).


@  -As above


X-rays interpreted by me (1pt min.).


@  -Chest x-ray shows no acute process


CT interpreted by me (1pt min.).


@  -None done


U/S interpreted by me (1pt. min.).


@  -None done


What testing was considered but not performed or refused? (CT, X-rays, U/S, 

labs)? Why?


@  -None


What meds were considered but not given or refused? Why?


@  -None


Did you discuss the management of the patient with other professionals 

(professionals i.e. , PA, NP, lab, RT, psych nurse, , , 

teacher, , )? Give summary


@  -Case was discussed with Dr. Dodson, who will admit covering hospital call.


Was smoking cessation discussed for >3mins.?


@  -No


Was critical care preformed (if so, how long)?


@  -33 minutes.  critical care time


Were there social determinants of health that impacted care today? How? 

(Homelessness, low income, unemployed, alcoholism, drug addiction, 

transportation, low edu. Level, literacy, decrease access to med. care, long term, 

rehab)?


@  -No


Was there de-escalation of care discussed even if they declined (Discuss DNR or 

withdrawal of care, Hospice)? DNR status


@  -No


What co-morbidities impacted this encounter? (DM, HTN, Smoking, COPD, CAD, 

Cancer, CVA, ARF, Chemo, Hep., AIDS, mental health diagnosis, sleep apnea, 

morbid obesity)?


@  -None


Was patient admitted / discharged? Hospital course, mention meds given and 

route, prescriptions, significant lab abnormalities, going to OR and other 

pertinent info.


@  -Patient reevaluated and resting comfortable in bed.  Heart rate varies 

between 98 and 125.  Patient and family updated on results and plan.  Patient 

will be admitted.  Admission orders written. 


Undiagnosed new problem with uncertain prognosis?


@  -No


Drug Therapy requiring intensive monitoring for toxicity (Heparin, Nitro, 

Insulin, Cardizem)?


@  - is on Cardizem drip and will be added heparin drip for monitoring.


Were any procedures done?


@  -No


Diagnosis/symptom?


@  -A A. fib with RVR


Acute, or Chronic, or Acute on Chronic?


@  -Acute on chronic


Uncomplicated (without systemic symptoms) or Complicated (systemic symptoms)?


@  -default


Side effects of treatment?


@  -No


Exacerbation, Progression, or Severe Exacerbation?


@  -No


Poses a threat to life or bodily function? How? (Chest pain, USA, MI, pneumonia,

 PE, COPD, DKA, ARF, appy, cholecystitis, CVA, Diverticulitis, Homicidal, 

Suicidal, threat to staff... and all critical care pts)


@  -No





- Lab Data


Result diagrams: 


                                 23 19:24





                                 23 19:24


                                   Lab Results











  23 Range/Units





  19:24 19:24 19:24 


 


WBC  8.4    (3.8-10.6)  k/uL


 


RBC  4.08 L    (4.30-5.90)  m/uL


 


Hgb  13.6    (13.0-17.5)  gm/dL


 


Hct  40.0    (39.0-53.0)  %


 


MCV  98.0    (80.0-100.0)  fL


 


MCH  33.2    (25.0-35.0)  pg


 


MCHC  33.9    (31.0-37.0)  g/dL


 


RDW  12.7    (11.5-15.5)  %


 


Plt Count  139 L    (150-450)  k/uL


 


MPV  10.1    


 


Neutrophils %  68    %


 


Lymphocytes %  22    %


 


Monocytes %  6    %


 


Eosinophils %  2    %


 


Basophils %  0    %


 


Neutrophils #  5.7    (1.3-7.7)  k/uL


 


Lymphocytes #  1.9    (1.0-4.8)  k/uL


 


Monocytes #  0.5    (0-1.0)  k/uL


 


Eosinophils #  0.1    (0-0.7)  k/uL


 


Basophils #  0.0    (0-0.2)  k/uL


 


PT   11.5   (9.0-12.0)  sec


 


INR   1.1   (<1.2)  


 


APTT   23.1   (22.0-30.0)  sec


 


Sodium    140  (137-145)  mmol/L


 


Potassium    3.6  (3.5-5.1)  mmol/L


 


Chloride    103  ()  mmol/L


 


Carbon Dioxide    28  (22-30)  mmol/L


 


Anion Gap    9  mmol/L


 


BUN    22 H  (9-20)  mg/dL


 


Creatinine    0.80  (0.66-1.25)  mg/dL


 


Est GFR (CKD-EPI)AfAm    >90  (>60 ml/min/1.73 sqM)  


 


Est GFR (CKD-EPI)NonAf    >90  (>60 ml/min/1.73 sqM)  


 


Glucose    129 H  (74-99)  mg/dL


 


Calcium    8.8  (8.4-10.2)  mg/dL


 


Magnesium    2.1  (1.6-2.3)  mg/dL


 


Total Bilirubin    0.4  (0.2-1.3)  mg/dL


 


AST    28  (17-59)  U/L


 


ALT    25  (4-49)  U/L


 


Alkaline Phosphatase    74  ()  U/L


 


Troponin I     (0.000-0.034)  ng/mL


 


Total Protein    7.1  (6.3-8.2)  g/dL


 


Albumin    4.1  (3.5-5.0)  g/dL














  23 Range/Units





  19:24 


 


WBC   (3.8-10.6)  k/uL


 


RBC   (4.30-5.90)  m/uL


 


Hgb   (13.0-17.5)  gm/dL


 


Hct   (39.0-53.0)  %


 


MCV   (80.0-100.0)  fL


 


MCH   (25.0-35.0)  pg


 


MCHC   (31.0-37.0)  g/dL


 


RDW   (11.5-15.5)  %


 


Plt Count   (150-450)  k/uL


 


MPV   


 


Neutrophils %   %


 


Lymphocytes %   %


 


Monocytes %   %


 


Eosinophils %   %


 


Basophils %   %


 


Neutrophils #   (1.3-7.7)  k/uL


 


Lymphocytes #   (1.0-4.8)  k/uL


 


Monocytes #   (0-1.0)  k/uL


 


Eosinophils #   (0-0.7)  k/uL


 


Basophils #   (0-0.2)  k/uL


 


PT   (9.0-12.0)  sec


 


INR   (<1.2)  


 


APTT   (22.0-30.0)  sec


 


Sodium   (137-145)  mmol/L


 


Potassium   (3.5-5.1)  mmol/L


 


Chloride   ()  mmol/L


 


Carbon Dioxide   (22-30)  mmol/L


 


Anion Gap   mmol/L


 


BUN   (9-20)  mg/dL


 


Creatinine   (0.66-1.25)  mg/dL


 


Est GFR (CKD-EPI)AfAm   (>60 ml/min/1.73 sqM)  


 


Est GFR (CKD-EPI)NonAf   (>60 ml/min/1.73 sqM)  


 


Glucose   (74-99)  mg/dL


 


Calcium   (8.4-10.2)  mg/dL


 


Magnesium   (1.6-2.3)  mg/dL


 


Total Bilirubin   (0.2-1.3)  mg/dL


 


AST   (17-59)  U/L


 


ALT   (4-49)  U/L


 


Alkaline Phosphatase   ()  U/L


 


Troponin I  <0.012  (0.000-0.034)  ng/mL


 


Total Protein   (6.3-8.2)  g/dL


 


Albumin   (3.5-5.0)  g/dL














Critical Care Time


Critical Care Time: Yes


Total Critical Care Time: 33





Disposition


Clinical Impression: 


 Atrial fibrillation, Tachycardia





Disposition: ADMITTED AS IP TO THIS HOSP


Is patient prescribed a controlled substance at d/c from ED?: No


Referrals: 


None,Stated [Primary Care Provider] - 1-2 days


Time of Disposition: 20:23

## 2023-08-23 LAB
CHOLEST SERPL-MCNC: 137 MG/DL (ref 0–200)
HDLC SERPL-MCNC: 46 MG/DL (ref 40–60)
LDLC SERPL CALC-MCNC: 67.2 MG/DL (ref 0–131)
PLATELET # BLD AUTO: 146 K/UL (ref 150–450)
TRIGL SERPL-MCNC: 119 MG/DL (ref 0–149)
VLDLC SERPL CALC-MCNC: 23.8 MG/DL (ref 5–40)

## 2023-08-23 RX ADMIN — METOPROLOL SUCCINATE SCH MG: 50 TABLET, EXTENDED RELEASE ORAL at 09:38

## 2023-08-23 RX ADMIN — APIXABAN SCH MG: 5 TABLET, FILM COATED ORAL at 09:34

## 2023-08-23 RX ADMIN — METOPROLOL SUCCINATE SCH MG: 50 TABLET, EXTENDED RELEASE ORAL at 20:27

## 2023-08-23 RX ADMIN — APIXABAN SCH MG: 5 TABLET, FILM COATED ORAL at 20:27

## 2023-08-23 RX ADMIN — ATORVASTATIN CALCIUM SCH MG: 40 TABLET, FILM COATED ORAL at 09:34

## 2023-08-23 RX ADMIN — LISINOPRIL AND HYDROCHLOROTHIAZIDE SCH EACH: 12.5; 2 TABLET ORAL at 09:34

## 2023-08-23 RX ADMIN — LEVOTHYROXINE SODIUM SCH MCG: 75 TABLET ORAL at 06:16

## 2023-08-23 NOTE — P.CRDCN
History of Present Illness


History of present illness: 





HISTORY OF PRESENT ILLNESS:  





This is a 67-year-old male with a past medical history significant for 

hypothyroidism, hypertension, hyperlipidemia, and atrial flutter/fibrillation. 

Patient follows with Dr. Parker at [facility]. We have been asked to see 

the patient in consultation for atrial fibrillation. Patient examined at the 

bedside in the emergency room.  Patient presented to the hospital with a chief 

complaint of palpitations.  EKG performed on arrival reveals sinus mechanism 

with PACs.  Bedside telemetry at the time of examination reveals sinus mech

anism.  The patient states he recently had Covid about 3 weeks ago.  He states 

he is still having some side effects from his Covid such as feeling rundown, 

mild shortness of breath, and a sore throat.  He states that his cardiologist 

recently prescribed Eliquis for him to take but he has not started this because 

he is concerned about the risk of bleeding.  However, the patient is no 

agreeable to begin intake regulation.





* EKG reveals sinus mechanism with PACs


* Chest xray no evidence for acute pulmonary process


* Laboratory data: WBC 8.4.  Hemoglobin 13.6.  Platelet count 139.  Sodium 140. 

  Potassium 3.6.  BUN 22.  Creatinine 0.80.  Troponin negative 3.  TSH 2.130.


* Current home cardiac medications include aspirin 81 mg daily, lisinopril-

  hydrochlorothiazide 20-12.5 2 tablets daily, atorvastatin 40 mg daily, and 

  metoprolol succinate 50 mg twice a day





REVIEW OF SYSTEMS: 


At the time of my exam:


CONSTITUTIONAL: Denies fever or chills.


HEENT: Denies blurred vision, vision changes, or eye pain. Denies hemoptysis 


CARDIOVASCULAR: Denies chest pain. Denies orthopnea. Denies PND. Denies 

palpitations


RESPIRATORY: Denies shortness of breath. 


GASTROINTESTINAL: Denies abdominal pain. Denies nausea or vomiting. 


HEMATOLOGIC: Denies bleeding disorders.


GENITOURINARY:  Denies any blood in urine.


SKIN: Denies pruitis. Denies rash.





PHYSICAL EXAM: 


VITAL SIGNS: Reviewed.


GENERAL: Well-developed in no acute distress. 


HEENT: Head is normocephalic. Pupils are equal, round. Sclerae anicteric. Mucous

membranes of the mouth are moist. Neck supple. No JVD or thyromegaly


LUNGS: Respirations even and unlabored. Lungs essentially clear to auscultation 

bilaterally.


HEART: Regular rate and rhythm.  S1 and S2 heard.


ABDOMEN: Soft. Nondistended. Nontender.


EXTREMITIES: Normal range of motion.  No clubbing or cyanosis.  Peripheral 

pulses intact.  No lower extremity edema


NEUROLOGIC: Awake and alert. Oriented x 3. 





ASSESSMENT: 


Palpitations


Paroxysmal typical atrial flutter/atrial fibrillation, currently maintaining 

sinus mechanism


Hypertension


Hyperlipidemia


Hypothyroidism 





PLAN: 


Obtain 2-D echo to assess cardiac structure and function


Resume home cardiac medications


Discontinue IV heparin and IV Cardizem


Begin Eliquis 5mg BID (patient already has a prescription for this as he was 

prescribed recently by his primary cardiologist)


Continue telemetry monitoring


Further recommendations pending patient's course








Nurse practitioner note has been reviewed by physician. Signing provider agrees 

with the documented findings, assessment, and plan of care. 











Past Medical History


Past Medical History: Atrial Fibrillation, Hypertension, Prostate Disorder, 

Thyroid Disorder


Additional Past Medical History / Comment(s): FREQUENT PVCS, hypothyroid, 

genital herpes


History of Any Multi-Drug Resistant Organisms: None Reported


Past Surgical History: Heart Catheterization With Stent, Orthopedic Surgery, 

Tonsillectomy


Additional Past Surgical History / Comment(s): knee,shoulder, and wrist


Past Anesthesia/Blood Transfusion Reactions: No Reported Reaction


Date of Last Stent Placement:: 


Past Psychological History: No Psychological Hx Reported


Past Alcohol Use History: None Reported


Past Drug Use History: None Reported





- Past Family History


  ** Mother


Family Medical History: Asthma, COPD, Coronary Artery Disease (CAD), Diabetes 

Mellitus


Additional Family Medical History / Comment(s):  at 75yrs old, polyps in

colon, colostomy bag





  ** Father


Family Medical History: Myocardial Infarction (MI)


Additional Family Medical History / Comment(s):  with a heart attack





Medications and Allergies


                                Home Medications











 Medication  Instructions  Recorded  Confirmed  Type


 


Aspirin 81 mg PO DAILY 14 History


 


Metoprolol Succinate [Toprol XL] 50 mg PO BID 14 History


 


Potassium Gluconate [Potassium 99 mg PO DAILY 14 History





Gluconate ER]    


 


Atorvastatin [Lipitor] 40 mg PO DAILY 23 History


 


Ibuprofen [Motrin] 800 mg PO Q8H PRN 23 History


 


Levothyroxine Sodium [Synthroid] 150 mcg PO DAILY 23 History


 


Lisinopril-Hctz 20-12.5 mg 2 tab PO DAILY 23 History





[Zestoretic 20-12.5]    


 


Magnesium Oxide [Magnesium] 500 mg PO DAILY 23 History








                                    Allergies











Allergy/AdvReac Type Severity Reaction Status Date / Time


 


No Known Allergies Allergy   Verified 23 20:56














Physical Exam


Vitals: 


                                   Vital Signs











  Temp Pulse Resp BP Pulse Ox


 


 23 08:47   56 L  18  125/76  97


 


 23 06:00   54 L  18  104/64  98


 


 23 05:00   53 L  18  98/52  98


 


 23 04:00   57 L  18  105/56  98


 


 23 02:00   55 L  18  119/67  98


 


 23 01:00   64  18  118/64  98


 


 23 00:00   57 L  18  117/63  98


 


 23 23:00   60  18  108/67  98


 


 23 22:00   64  18  107/70  98


 


 23 20:50   75  18  113/72  98


 


 23 19:50   103 H  18  119/86  98


 


 23 18:47  98 F  59 L  18  109/68  96








                                Intake and Output











 23





 22:59 06:59 14:59


 


Intake Total  66.68 


 


Balance  66.68 


 


Intake:   


 


  Intake, IV Titration  66.68 





  Amount   


 


    Heparin Sod,Pork in 0.45%  66.68 





    NaCl 25,000 unit In 0.45   





    % NaCl 1 250ml.bag @ 9   





    UNITS/KG/HR 10.002 mls/hr   





    IV .Q24H [facility] Rx#:   





    [id number]   


 


Other:   


 


  Weight 111.13 kg  














Results





                                 23 19:24





                                 23 19:24


                                 Cardiac Enzymes











  23 Range/Units





  19:24 19:24 22:15 


 


AST  28    (17-59)  U/L


 


Troponin I   <0.012  <0.012  (0.000-0.034)  ng/mL














  23 Range/Units





  02:14 


 


AST   (17-59)  U/L


 


Troponin I  <0.012  (0.000-0.034)  ng/mL








                                   Coagulation











  23 Range/Units





  19:24 02:14 


 


PT  11.5   (9.0-12.0)  sec


 


APTT  23.1  41.3 H  (22.0-30.0)  sec








                                       CBC











  23 Range/Units





  19:24 


 


WBC  8.4  (3.8-10.6)  k/uL


 


RBC  4.08 L  (4.30-5.90)  m/uL


 


Hgb  13.6  (13.0-17.5)  gm/dL


 


Hct  40.0  (39.0-53.0)  %


 


Plt Count  139 L  (150-450)  k/uL








                          Comprehensive Metabolic Panel











  23 Range/Units





  19:24 


 


Sodium  140  (137-145)  mmol/L


 


Potassium  3.6  (3.5-5.1)  mmol/L


 


Chloride  103  ()  mmol/L


 


Carbon Dioxide  28  (22-30)  mmol/L


 


BUN  22 H  (9-20)  mg/dL


 


Creatinine  0.80  (0.66-1.25)  mg/dL


 


Glucose  129 H  (74-99)  mg/dL


 


Calcium  8.8  (8.4-10.2)  mg/dL


 


AST  28  (17-59)  U/L


 


ALT  25  (4-49)  U/L


 


Alkaline Phosphatase  74  ()  U/L


 


Total Protein  7.1  (6.3-8.2)  g/dL


 


Albumin  4.1  (3.5-5.0)  g/dL








                               Current Medications











Generic Name Dose Route Start Last Admin





  Trade Name Freq  PRN Reason Stop Dose Admin


 


Aspirin  81 mg  23 09:00 





  Aspirin 81 Mg  PO  





  DAILY [facility]  


 


Atorvastatin Calcium  40 mg  23 09:00 





  Atorvastatin 40 Mg Tab  PO  





  DAILY [facility]  


 


Lisinopril/HCTZ  2 each  23 09:00 





  Lisinopril-Hctz 20-12.5 Mg 1 Each Tab  PO  





  DAILY [facility]  


 


Heparin Sodium (Porcine)  0 unit  23 04:07  23 04:09





  Heparin Sodium 1,000 Un/Ml (10ml Vl)  IV   2,778 unit





  PER PROTOCOL PRN   Administration





  Low PTT  





  Protocol  


 


Heparin Sodium/Sodium Chloride  250 mls @ 10.002 mls/hr  23 20:30  

23 03:54





  25,000 unit/ Sodium Chloride  IV   11 units/kg/hr





  .Q24H [facility]   12.224 mls/hr





    Titration





  Protocol  





  9 UNITS/KG/HR  


 


Levothyroxine Sodium  150 mcg  23 06:30  23 06:16





  Levothyroxine 75 Mcg Tab  PO   150 mcg





  DAILY@0630 [facility]   Administration


 


Metoprolol Succinate  50 mg  23 09:00 





  Metoprolol Succinate (Er) 50 Mg Tab.Er.24h  PO  





  BID MY[facility]  








                                Intake and Output











 23





 22:59 06:59 14:59


 


Intake Total  66.68 


 


Balance  66.68 


 


Intake:   


 


  Intake, IV Titration  66.68 





  Amount   


 


    Heparin Sod,Pork in 0.45%  66.68 





    NaCl 25,000 unit In 0.45   





    % NaCl 1 250ml.bag @ 9   





    UNITS/KG/HR 10.002 mls/hr   





    IV .Q24H [facility] Rx#:   





    [id number]   


 


Other:   


 


  Weight 111.13 kg  








                                        





                                 23 19:24 





                                 23 19:24

## 2023-08-23 NOTE — P.PN
Subjective


Progress Note Date: 08/23/23





No new complaints.  Patient says the palpitations have significantly improved.





Gen: awake, alert


HEENT: normocephalic, atraumatic, good hearing acuity, moist mucous membranes


Resp: good air exchange, breathing comfortably with no accessory muscle use


CVS: good distal perfusion x 4,


GI: soft, NTTP, ND


: no SPT, no CVAT, dinh catheter not present


MSK: no pitting edema, no clubbing


Neuro: non-focal, moving all extremities


Psych: cooperative, euthymic mood





Hospital course:





67-year-old man with medical history of hypothyroidism, hyperlipidemia, 

hypertension presented for evaluation palpitations.  In the emergency room, 

patient was afebrile, 109/68, heart rate 59, 96% on room air.  CBC shows plate

let count of 139, otherwise unremarkable.  Basic metabolic panel shows BMI 22, 

otherwise unremarkable.  Liver function tests are unremarkable.  TSH is 2.13, 

free T4 is 1.08, free T3 is 2.3.  Troponin is less than 0.0123.  Coags are 

unremarkable.  Chest x-ray shows normal-sized heart, clear parenchyma 

bilaterally.  EKG shows sinus tachycardia with first-degree AV block and 

frequent PACs.  Case was discussed with emergency room and decision was made to 

admit patient observation for palpitations.





Assessment:





Paroxysmal atrial flutter ablation with RVR


History of atrial flutter on aspirin


Hypothyroidism


Hyperlipidemia


Hypertension








Plan:





Vital signs, lab work, imaging reviewed and listed in the hospital course above


Echocardiogram reviewed, mild LVH with preserved systolic function, RV en

largement, mild to moderate aortic stenosis with peak gradient of 17.2





Heparin drip transition to Apixiban


Cardizem drip transition to metoprolol


Continue to monitor patient on telemetry


Cardiology recommendations appreciated





Patient is full code





Objective





- Vital Signs


Vital signs: 


                                   Vital Signs











Temp  98 F   08/22/23 18:47


 


Pulse  55 L  08/23/23 09:30


 


Resp  18   08/23/23 09:30


 


BP  130/80   08/23/23 09:30


 


Pulse Ox  97   08/23/23 09:30


 


FiO2      








                                 Intake & Output











 08/22/23 08/23/23 08/23/23





 18:59 06:59 18:59


 


Intake Total  66.68 


 


Balance  66.68 


 


Weight 111.13 kg  


 


Intake:   


 


  Intake, IV Titration  66.68 





  Amount   


 


    Heparin Sod,Pork in 0.45%  66.68 





    NaCl 25,000 unit In 0.45   





    % NaCl 1 250ml.bag @ 9   





    UNITS/KG/HR 10.002 mls/hr   





    IV .Q24H [facility] Rx#:   





    [id number]   














- Labs


CBC & Chem 7: 


                                 08/23/23 10:46





                                 08/22/23 19:24


Labs: 


                  Abnormal Lab Results - Last 24 Hours (Table)











  08/22/23 08/22/23 08/23/23 Range/Units





  19:24 19:24 02:14 


 


RBC  4.08 L    (4.30-5.90)  m/uL


 


Plt Count  139 L    (150-450)  k/uL


 


APTT    41.3 H  (22.0-30.0)  sec


 


BUN   22 H   (9-20)  mg/dL


 


Glucose   129 H   (74-99)  mg/dL














  08/23/23 Range/Units





  10:46 


 


RBC   (4.30-5.90)  m/uL


 


Plt Count  146 L  (150-450)  k/uL


 


APTT   (22.0-30.0)  sec


 


BUN   (9-20)  mg/dL


 


Glucose   (74-99)  mg/dL

## 2023-08-23 NOTE — CA
Transthoracic Echo Report 

 Name: Moo Urbano 

 MRN:    [id number] 

 Age:    67     Gender:     M 

 

 :    1955 

 Exam Date:     2023 07:32 

 Exam Location: [facility] 

 Ht (in):     70     Wt (lb):     245 

 Ordering Physician:        Helio Muhammad DO 

 Attending/Referring Phys: 

 Technician         [facility] 

 Procedure CPT: 

 Indications:       a fib 

 

 Cardiac Hx: 

 Technical Quality:      Fair 

 Contrast 1:                                Total Dose (mL): 

 Contrast 2:                                Total Dose (mL): 

 

 MEASUREMENTS  (Male / Female) Normal Values 

 2D ECHO 

 LV Diastolic Diameter PLAX        5.1 cm                4.2 - 5.9 / 3.9 - 5.3 cm 

 LV Systolic Diameter PLAX         4.0 cm                 

 IVS Diastolic Thickness           0.9 cm                0.6 - 1.0 / 0.6 - 0.9 cm 

 LVPW Diastolic Thickness          1.0 cm                0.6 - 1.0 / 0.6 - 0.9 cm 

 LV Relative Wall Thickness        0.4                    

 LVOT Diameter                     2.0 cm                 

 Ascending Aorta Diameter          3.5 cm                 

 

 M-MODE 

 Aortic Root Diameter MM           3.0 cm                 

 LA Systolic Diameter MM           4.7 cm                 

 LA Ao Ratio MM                    1.6                    

 AV Cusp Separation MM             1.4 cm                 

 

 DOPPLER 

 AV Peak Velocity                  207.4 cm/s             

 AV Peak Gradient                  17.2 mmHg              

 AV Mean Velocity                  148.2 cm/s             

 AV Mean Gradient                  9.7 mmHg               

 AV Velocity Time Integral         46.7 cm                

 LVOT Peak Velocity                92.8 cm/s              

 LVOT Peak Gradient                3.4 mmHg               

 LVOT Velocity Time Integral       21.4 cm                

 LVOT Stroke Volume                64.6 cm???               

 LVOT Stroke Volume Index          28.4 ml/m???             

 LVOT Cardiac Index                1545.6 cm???/min???m???      

 AV Area Cont Eq vti               1.4 cm???                

 AV Area Cont Eq pk                1.4 cm???                

 Mitral E Point Velocity           84.7 cm/s              

 Mitral A Point Velocity           78.8 cm/s              

 Mitral E to A Ratio               1.1                    

 MV Deceleration Time              159.0 ms               

 LV E' Lateral Velocity            8.6 cm/s               

 Mitral E to LV E' Lateral Ratio   9.9                    

 LV E' Septal Velocity             8.2 cm/s               

 Mitral E to LV E' Septal Ratio    10.3                   

 TR Peak Velocity                  139.6 cm/s             

 TR Peak Gradient                  7.8 mmHg               

 Right Atrial Pressure             3.0 mmHg               

 Pulmonary Artery Systolic Pressu  10.8 mmHg              

 Right Ventricular Systolic Press  10.8 mmHg              

 

 

 FINDINGS 

 Left Ventricle 

 Left ventricular wall thickness at upper limits of normal. Left ventricular  

 cavity size normal. Low normal left ventricular systolic function with no  

 obvious regional wall motion abnormalities. Left ventricular ejection fraction  

 is estimated at 50-55%. 

 

 Right Ventricle 

 Severe right ventricular dilatation. 

 

 Right Atrium 

 Mild right atrial dilatation. 

 

 Left Atrium 

 Normal left atrial size. 

 

 Mitral Valve 

 Structurally normal mitral valve. Mild mitral regurgitation. 

 

 Aortic Valve 

 Trileaflet aortic valve. Aortic valve sclerosis. Diffuse thickening of the  

 aortic valve cusps with reduced excursion. Mild-to-moderate aortic stenosis  

 with a peak gradient of 17.2 mmHg and a mean gradient of 9.7 mmHg. 

 

 Tricuspid Valve 

 Structurally normal tricuspid valve. Trace tricuspid regurgitation. 

 

 Pulmonic Valve 

 Structurally normal pulmonic valve. Mild pulmonic regurgitation. 

 

 Pericardium 

 No pericardial effusion. 

 

 Aorta 

 Normal size aortic root and proximal ascending aorta. 

 

 CONCLUSIONS 

 Mild LVH with preserved systolic function 

 RV enlargement 

 Thickened aortic valve leaflets with mild stenosis 

 Previewed by:  

 Dr. Harrison Tijerina MD 

 (Electronically Signed) 

 Final Date:      2023 10:46

## 2023-08-23 NOTE — P.HPIM
History of Present Illness


H&P Date: 23


Chief Complaint: Palpitations





67-year-old male with hypertension, A flutter





Is coming in for 2 hours of palpitations denies any associated dizziness 

lightheadedness shortness of breath or chest pain.  Reports that he has history 

of a flutter and he would get very short-lived episodes of palpitation may be 

once every few months.  Is very active at baseline goes to gym every day does 

some cardio and weightlifting with no limitations however today he noticed 

episodes of palpitations while working in his garage was not associated with any

shortness of breath dizziness lightheadedness or chest pain no nausea no 

vomiting no fevers no chills no cough.  However he does recall having recently 

diagnosed with Covid he had generalized weakness and easy fatigability this was 

recent however he repeated the test about a week ago and he was negative but he 

still recovering.





This time he decided to come in for evaluation as his palpitations persisted 

despite taking an extra dose of Lopressor with no improvement as it continued 

for over 2 hours he decided to come in for evaluation





Patient denies any recent traveling history of blood clots he denies being on 

any blood thinners he takes aspirin





Denies tobacco smoking and illicit drugs or alcohol








review of systems


Pertinent positives as noted in HPI. All other systems were reviewed and are 

negative








on exam


Constitutional:          No acute distress, conversant, pleasant


Eyes:      Anicteric sclerae, moist conjunctiva, 


         Pupils equal round reactive to light





ENMT:      NC/AT


         Oropharynx clear, no erythema, or exudates





Neck:      Supple,  no masses, or JVD


         No carotid bruits


         No thyromegaly





Lungs:      Clear to auscultation


         Clear to percussion


         Normal respiratory effort, no accessory muscle use 





Cardiovascular:      Heart regular in rate and rhythm, 


         No murmurs, gallops, or rubs


         No peripheral edema





Abdominal:       Soft


         Nontender, no guarding, rebound or rigidity


         Abdomen moving with respiration


         Normoactive bowel sounds


         No hepatomegaly, No splenomegaly


         No palpable mass 


         No abdominal wall hernia noted 





Skin:      Normal temperature, tone, texture, turgor


         No induration


         No subcutaneous nodules 


         No rash, lesions


         No ulcers





Extremities:      No digital cyanosis 


         No clubbing


         Pedal pulses intact and symmetrical


         Radial pulses intact and symmetrical 


         No calf tenderness 





Psychiatric:      Alert and oriented to person, place and time


         Appropriate affect


         fair judgement   


      


Neuro      Muscles Strength 5/5 in all 4 extremities 


         Sensation to light touch grossly present throughout


         Cranial nerves II-XII grossly intact 


Lymphatics:       no palpable cervical or supraclavicular   lymph nodes  











Past Medical History


Past Medical History: Atrial Fibrillation, Hypertension, Prostate Disorder, 

Thyroid Disorder


Additional Past Medical History / Comment(s): FREQUENT PVCS, hypothyroid, 

genital herpes


History of Any Multi-Drug Resistant Organisms: None Reported


Past Surgical History: Heart Catheterization With Stent, Orthopedic Surgery, 

Tonsillectomy


Additional Past Surgical History / Comment(s): knee,shoulder, and wrist


Past Anesthesia/Blood Transfusion Reactions: No Reported Reaction


Date of Last Stent Placement:: 


Past Psychological History: No Psychological Hx Reported


Past Alcohol Use History: None Reported


Past Drug Use History: None Reported





- Past Family History


  ** Mother


Family Medical History: Asthma, COPD, Coronary Artery Disease (CAD), Diabetes 

Mellitus


Additional Family Medical History / Comment(s):  at 75yrs old, polyps in

colon, colostomy bag





  ** Father


Family Medical History: Myocardial Infarction (MI)


Additional Family Medical History / Comment(s):  with a heart attack





Medications and Allergies


                                Home Medications











 Medication  Instructions  Recorded  Confirmed  Type


 


Aspirin 81 mg PO DAILY 14 History


 


Metoprolol Succinate [Toprol XL] 50 mg PO BID 14 History


 


Potassium Gluconate [Potassium 99 mg PO DAILY 14 History





Gluconate ER]    


 


Atorvastatin [Lipitor] 40 mg PO DAILY 23 History


 


Ibuprofen [Motrin] 800 mg PO Q8H PRN 23 History


 


Levothyroxine Sodium [Synthroid] 150 mcg PO DAILY 23 History


 


Lisinopril-Hctz 20-12.5 mg 2 tab PO DAILY 23 History





[Zestoretic 20-12.5]    


 


Magnesium Oxide [Magnesium] 500 mg PO DAILY 23 History








                                    Allergies











Allergy/AdvReac Type Severity Reaction Status Date / Time


 


No Known Allergies Allergy   Verified 23 20:56














Physical Exam


Vitals: 


                                   Vital Signs











  Temp Pulse Resp BP Pulse Ox


 


 23 02:00   55 L  18  119/67  98


 


 23 01:00   64  18  118/64  98


 


 23 00:00   57 L  18  117/63  98


 


 08/22/23 23:00   60  18  108/67  98


 


 23 22:00   64  18  107/70  98


 


 23 20:50   75  18  113/72  98


 


 23 19:50   103 H  18  119/86  98


 


 23 18:47  98 F  59 L  18  109/68  96








                                Intake and Output











 23





 14:59 22:59 06:59


 


Other:   


 


  Weight  111.13 kg 














Results


CBC & Chem 7: 


                                 23 19:24





                                 23 19:24


Labs: 


                  Abnormal Lab Results - Last 24 Hours (Table)











  23 Range/Units





  19:24 19:24 


 


RBC  4.08 L   (4.30-5.90)  m/uL


 


Plt Count  139 L   (150-450)  k/uL


 


BUN   22 H  (9-20)  mg/dL


 


Glucose   129 H  (74-99)  mg/dL














Assessment and Plan


Assessment: 





67-year-old male coming in for palpitations I discussed the case with the ED 

doctor and accepted the admission for atrial fibrillation with RVR with 

anticipated length of stay more than 2 midnights





A. fib with RV


History of atrial flutter on aspirin


Patient started on Cardizem drip


Heparin drip


Troponins negative


Hemoglobin 13.3 unremarkable


Renal function unremarkable sodium 138 potassium 4.1 BUN 14 creatinine 1.11 


 cardiology evaluation


Resume metoprolol 50 mg  bid


Chest x-ray no acute pathology





Chronic conditions


hypothyroidism levothyroxin 


Hyperlipidemia resume statin


Hypertension resume lisinopril and metoprolol








full code


DVT prophylaxis on heparin drip for A. fib

## 2023-08-24 VITALS — DIASTOLIC BLOOD PRESSURE: 75 MMHG | TEMPERATURE: 98.1 F | SYSTOLIC BLOOD PRESSURE: 122 MMHG

## 2023-08-24 VITALS — HEART RATE: 60 BPM

## 2023-08-24 LAB — PLATELET # BLD AUTO: 128 K/UL (ref 150–450)

## 2023-08-24 RX ADMIN — METOPROLOL SUCCINATE SCH MG: 50 TABLET, EXTENDED RELEASE ORAL at 08:32

## 2023-08-24 RX ADMIN — LISINOPRIL AND HYDROCHLOROTHIAZIDE SCH EACH: 12.5; 2 TABLET ORAL at 08:32

## 2023-08-24 RX ADMIN — ATORVASTATIN CALCIUM SCH MG: 40 TABLET, FILM COATED ORAL at 08:32

## 2023-08-24 RX ADMIN — LEVOTHYROXINE SODIUM SCH MCG: 75 TABLET ORAL at 05:38

## 2023-08-24 RX ADMIN — APIXABAN SCH MG: 5 TABLET, FILM COATED ORAL at 08:32

## 2023-08-24 NOTE — P.DS
Providers


Date of admission: 


08/22/23 20:24





Expected date of discharge: 08/24/23


Attending physician: 


Ottoniel Bautista MD





Consults: 





                                        





08/22/23 20:24


Consult Physician Urgent 


   Consulting Provider: Kirt Berry


   Consult Reason/Comments: a fib


   Do you want consulting provider notified?: Yes











Primary care physician: 


[facility] Course: 





Assessment:





Paroxysmal atrial flutter ablation with RVR


History of atrial flutter on aspirin


Hypothyroidism


Hyperlipidemia


Hypertension





Hospital course:





67-year-old man with medical history of hypothyroidism, hyperlipidemia, 

hypertension presented for evaluation palpitations.  In the emergency room, 

patient was afebrile, 109/68, heart rate 59, 96% on room air.  CBC shows 

platelet count of 139, otherwise unremarkable.  Basic metabolic panel shows BMI 

22, otherwise unremarkable.  Liver function tests are unremarkable.  TSH is 

2.13, free T4 is 1.08, free T3 is 2.3.  Troponin is less than 0.0123.  Coags 

are unremarkable.  Chest x-ray shows normal-sized heart, clear parenchyma 

bilaterally.  EKG shows sinus tachycardia with first-degree AV block and 

frequent PACs.  Case was discussed with emergency room and decision was made to 

admit patient observation for palpitations.  Echo showed preserved EF to 50-55%,

severe RV dilation.  Pt was started on AC here for AFib.  He converted to NSR 

and his cardizem was stopped and transitioned back to metoprolol by cardiology 

service.  He felt back to baseline by day of discharge and was ambulating the 

halls freely.  He was discharged home with PCP and cardiology f/u.





I spent 38  minutes coordinating this discharge on 8/24








Gen: awake, alert


HEENT: normocephalic, atraumatic, good hearing acuity, moist mucous membranes


Resp: good air exchange, breathing comfortably with no accessory muscle use


CVS: good distal perfusion x 4,


GI: soft, NTTP, ND


: no SPT, no CVAT, dinh catheter not present


MSK: no pitting edema, no clubbing


Neuro: non-focal, moving all extremities


Psych: cooperative, euthymic mood














Patient Condition at Discharge: Good





Plan - Discharge Summary


Discharge Rx Participant: No


New Discharge Prescriptions: 


New


   Diltiazem Oral [Cardizem Oral] 60 mg PO DAILY PRN #90 tablet


     PRN Reason: tachycardia


   Apixaban [Eliquis] 5 mg PO BID #60 tab





Continue


   Aspirin 81 mg PO DAILY


   Metoprolol Succinate [Toprol XL] 50 mg PO BID


   Potassium Gluconate [Potassium Gluconate ER] 99 mg PO DAILY


   Levothyroxine Sodium [Synthroid] 150 mcg PO DAILY


   Magnesium Oxide [Magnesium] 500 mg PO DAILY


   Atorvastatin [Lipitor] 40 mg PO DAILY


   Lisinopril-Hctz 20-12.5 mg [Zestoretic 20-12.5] 2 tab PO DAILY





Discontinued


   Ibuprofen [Motrin] 800 mg PO Q8H PRN


     PRN Reason: Pain Or Fever > 100.5


Discharge Medication List





Aspirin 81 mg PO DAILY 07/21/14 [History]


Metoprolol Succinate [Toprol XL] 50 mg PO BID 07/21/14 [History]


Potassium Gluconate [Potassium Gluconate ER] 99 mg PO DAILY 11/06/14 [History]


Atorvastatin [Lipitor] 40 mg PO DAILY 08/22/23 [History]


Levothyroxine Sodium [Synthroid] 150 mcg PO DAILY 08/22/23 [History]


Lisinopril-Hctz 20-12.5 mg [Zestoretic 20-12.5] 2 tab PO DAILY 08/22/23 

[History]


Magnesium Oxide [Magnesium] 500 mg PO DAILY 08/22/23 [History]


Apixaban [Eliquis] 5 mg PO BID #60 tab 08/24/23 [Rx]


Diltiazem Oral [Cardizem Oral] 60 mg PO DAILY PRN #90 tablet 08/24/23 [Rx]








Follow up Appointment(s)/Referral(s): 


None,Stated [REFERRING] - 1-2 days


Patient Instructions/Handouts:  A-fib (Atrial Fibrillation) (DC)


Discharge Disposition: HOME SELF-CARE

## 2023-08-24 NOTE — P.PN
Subjective





HISTORY OF PRESENT ILLNESS:  





This is a 67-year-old male with a past medical history significant for 

hypothyroidism, hypertension, hyperlipidemia, and atrial flutter/fibrillation. 

Patient follows with Dr. Parker at [facility]. We have been asked to see 

the patient in consultation for atrial fibrillation. Patient examined at the 

bedside in the emergency room.  Patient presented to the hospital with a chief 

complaint of palpitations.  EKG performed on arrival reveals sinus mechanism 

with PACs.  Bedside telemetry at the time of examination reveals sinus 

mechanism.  The patient states he recently had Covid about 3 weeks ago.  He 

states he is still having some side effects from his Covid such as feeling 

rundown, mild shortness of breath, and a sore throat.  He states that his 

cardiologist recently prescribed Eliquis for him to take but he has not started 

this because he is concerned about the risk of bleeding.  However, the patient 

is no agreeable to begin anticoagulation





* EKG reveals sinus mechanism with PACs


* Chest xray no evidence for acute pulmonary process


* Laboratory data: WBC 8.4.  Hemoglobin 13.6.  Platelet count 139.  Sodium 140. 

  Potassium 3.6.  BUN 22.  Creatinine 0.80.  Troponin negative 3.  TSH 2.130.


* Current home cardiac medications include aspirin 81 mg daily, 

  lisinopril-hydrochlorothiazide 20-12.5 2 tablets daily, atorvastatin 40 mg 

  daily, and metoprolol succinate 50 mg twice a day





8/24/2023


Patient examined this morning at the bedside.  Patient denies chest pain or 

pressure.  He denies shortness of breath.  Telemetry reveals sinus mechanism.  

Patient has been up ambulating in the hallways this morning with no complaints 

of palpitations.  Vital signs are stable.





PHYSICAL EXAM: 


VITAL SIGNS: Reviewed.


GENERAL: Well-developed in no acute distress. 


HEENT: Head is normocephalic. Pupils are equal, round. Sclerae anicteric. Mucous

membranes of the mouth are moist. Neck supple. No JVD or thyromegaly


LUNGS: Respirations even and unlabored. Lungs essentially clear to auscultation 

bilaterally.


HEART: Regular rate and rhythm.  S1 and S2 heard.


ABDOMEN: Soft. Nondistended. Nontender.


EXTREMITIES: Normal range of motion.  No clubbing or cyanosis.  Peripheral 

pulses intact.  No lower extremity edema


NEUROLOGIC: Awake and alert. Oriented x 3. 





ASSESSMENT: 


Palpitations


Paroxysmal typical atrial flutter/atrial fibrillation, currently maintaining 

sinus mechanism


Hypertension


Hyperlipidemia


Hypothyroidism 





PLAN: 


Continue current cardiac medications


Recommend Cardizem 60 mg PRN post discharge for palpitations


Consider Flecainide on an outpatient basis.  Recommend stress testing and event 

monitor to be performed by his primary cardiologist.


Patient may be discharged home today from a cardiac standpoint








Nurse practitioner note has been reviewed by physician. Signing provider agrees 

with the documented findings, assessment, and plan of care. 











Objective





- Vital Signs


Vital signs: 


                                   Vital Signs











Temp  98.1 F   08/24/23 08:00


 


Pulse  60   08/24/23 08:00


 


Resp  18   08/24/23 08:00


 


BP  122/75   08/24/23 08:00


 


Pulse Ox  95   08/24/23 08:00


 


FiO2  21   08/24/23 00:47








                                 Intake & Output











 08/23/23 08/24/23 08/24/23





 18:59 06:59 18:59


 


Intake Total   180


 


Balance   180


 


Intake:   


 


  Oral   180


 


Other:   


 


  Voiding Method  Toilet Toilet


 


  # Voids  2 














- Labs


CBC & Chem 7: 


                                 08/24/23 06:41





                                 08/22/23 19:24


Labs: 


                  Abnormal Lab Results - Last 24 Hours (Table)











  08/24/23 Range/Units





  06:41 


 


Plt Count  128 L  (150-450)  k/uL

## 2024-11-23 ENCOUNTER — HOSPITAL ENCOUNTER (OUTPATIENT)
Dept: HOSPITAL 47 - EC | Age: 69
Setting detail: OBSERVATION
LOS: 1 days | Discharge: HOME | End: 2024-11-24
Attending: INTERNAL MEDICINE | Admitting: INTERNAL MEDICINE
Payer: MEDICARE

## 2024-11-23 DIAGNOSIS — I25.10: ICD-10-CM

## 2024-11-23 DIAGNOSIS — N17.9: ICD-10-CM

## 2024-11-23 DIAGNOSIS — Z79.890: ICD-10-CM

## 2024-11-23 DIAGNOSIS — Z79.01: ICD-10-CM

## 2024-11-23 DIAGNOSIS — I10: ICD-10-CM

## 2024-11-23 DIAGNOSIS — Z98.61: ICD-10-CM

## 2024-11-23 DIAGNOSIS — E03.9: ICD-10-CM

## 2024-11-23 DIAGNOSIS — E78.5: ICD-10-CM

## 2024-11-23 DIAGNOSIS — Z79.899: ICD-10-CM

## 2024-11-23 DIAGNOSIS — Z82.49: ICD-10-CM

## 2024-11-23 DIAGNOSIS — Z87.891: ICD-10-CM

## 2024-11-23 DIAGNOSIS — I48.0: Primary | ICD-10-CM

## 2024-11-23 DIAGNOSIS — Z79.82: ICD-10-CM

## 2024-11-23 LAB
ALBUMIN SERPL-MCNC: 4.3 G/DL (ref 3.5–5)
ALP SERPL-CCNC: 76 U/L (ref 38–126)
ALT SERPL-CCNC: 24 U/L (ref 4–49)
ANION GAP SERPL CALC-SCNC: 5 MMOL/L
APTT BLD: 25.4 SEC (ref 22–30)
AST SERPL-CCNC: 22 U/L (ref 17–59)
BASOPHILS # BLD AUTO: 0.1 K/UL (ref 0–0.2)
BASOPHILS NFR BLD AUTO: 0 %
BUN SERPL-SCNC: 32 MG/DL (ref 9–20)
CALCIUM SPEC-MCNC: 9.2 MG/DL (ref 8.4–10.2)
CHLORIDE SERPL-SCNC: 102 MMOL/L (ref 98–107)
CO2 SERPL-SCNC: 30 MMOL/L (ref 22–30)
EOSINOPHIL # BLD AUTO: 0.1 K/UL (ref 0–0.7)
EOSINOPHIL NFR BLD AUTO: 1 %
ERYTHROCYTE [DISTWIDTH] IN BLOOD BY AUTOMATED COUNT: 4.34 M/UL (ref 4.3–5.9)
ERYTHROCYTE [DISTWIDTH] IN BLOOD: 12.4 % (ref 11.5–15.5)
GLUCOSE SERPL-MCNC: 98 MG/DL (ref 74–99)
HCT VFR BLD AUTO: 42.9 % (ref 39–53)
HGB BLD-MCNC: 14.3 GM/DL (ref 13–17.5)
INR PPP: 1 (ref ?–1.2)
LYMPHOCYTES # SPEC AUTO: 2 K/UL (ref 1–4.8)
LYMPHOCYTES NFR SPEC AUTO: 18 %
MAGNESIUM SPEC-SCNC: 2.3 MG/DL (ref 1.6–2.3)
MCH RBC QN AUTO: 33 PG (ref 25–35)
MCHC RBC AUTO-ENTMCNC: 33.4 G/DL (ref 31–37)
MCV RBC AUTO: 98.7 FL (ref 80–100)
MONOCYTES # BLD AUTO: 0.6 K/UL (ref 0–1)
MONOCYTES NFR BLD AUTO: 5 %
NEUTROPHILS # BLD AUTO: 8.3 K/UL (ref 1.3–7.7)
NEUTROPHILS NFR BLD AUTO: 73 %
PLATELET # BLD AUTO: 154 K/UL (ref 150–450)
POTASSIUM SERPL-SCNC: 4.1 MMOL/L (ref 3.5–5.1)
PROT SERPL-MCNC: 7.2 G/DL (ref 6.3–8.2)
PT BLD: 11.3 SEC (ref 10–12.5)
SODIUM SERPL-SCNC: 137 MMOL/L (ref 137–145)
WBC # BLD AUTO: 11.3 K/UL (ref 3.8–10.6)

## 2024-11-23 PROCEDURE — 85610 PROTHROMBIN TIME: CPT

## 2024-11-23 PROCEDURE — 83735 ASSAY OF MAGNESIUM: CPT

## 2024-11-23 PROCEDURE — 93005 ELECTROCARDIOGRAM TRACING: CPT

## 2024-11-23 PROCEDURE — 85025 COMPLETE CBC W/AUTO DIFF WBC: CPT

## 2024-11-23 PROCEDURE — 85730 THROMBOPLASTIN TIME PARTIAL: CPT

## 2024-11-23 PROCEDURE — 84443 ASSAY THYROID STIM HORMONE: CPT

## 2024-11-23 PROCEDURE — 84484 ASSAY OF TROPONIN QUANT: CPT

## 2024-11-23 PROCEDURE — 71046 X-RAY EXAM CHEST 2 VIEWS: CPT

## 2024-11-23 PROCEDURE — 80053 COMPREHEN METABOLIC PANEL: CPT

## 2024-11-23 PROCEDURE — 99285 EMERGENCY DEPT VISIT HI MDM: CPT

## 2024-11-23 PROCEDURE — 80048 BASIC METABOLIC PNL TOTAL CA: CPT

## 2024-11-23 PROCEDURE — 36415 COLL VENOUS BLD VENIPUNCTURE: CPT

## 2024-11-23 RX ADMIN — DILTIAZEM HYDROCHLORIDE ONE: 5 INJECTION INTRAVENOUS at 19:41

## 2024-11-23 RX ADMIN — DILTIAZEM HYDROCHLORIDE SCH: 5 INJECTION INTRAVENOUS at 19:41

## 2024-11-23 RX ADMIN — APIXABAN SCH MG: 5 TABLET, FILM COATED ORAL at 20:11

## 2024-11-23 RX ADMIN — METOPROLOL SUCCINATE SCH MG: 50 TABLET, EXTENDED RELEASE ORAL at 20:11

## 2024-11-24 VITALS
RESPIRATION RATE: 17 BRPM | TEMPERATURE: 98.3 F | SYSTOLIC BLOOD PRESSURE: 122 MMHG | HEART RATE: 59 BPM | DIASTOLIC BLOOD PRESSURE: 72 MMHG

## 2024-11-24 LAB
ANION GAP SERPL CALC-SCNC: 5 MMOL/L
BASOPHILS # BLD AUTO: 0 K/UL (ref 0–0.2)
BASOPHILS NFR BLD AUTO: 0 %
BUN SERPL-SCNC: 26 MG/DL (ref 9–20)
CALCIUM SPEC-MCNC: 9 MG/DL (ref 8.4–10.2)
CHLORIDE SERPL-SCNC: 100 MMOL/L (ref 98–107)
CO2 SERPL-SCNC: 31 MMOL/L (ref 22–30)
EOSINOPHIL # BLD AUTO: 0.2 K/UL (ref 0–0.7)
EOSINOPHIL NFR BLD AUTO: 2 %
ERYTHROCYTE [DISTWIDTH] IN BLOOD BY AUTOMATED COUNT: 4.22 M/UL (ref 4.3–5.9)
ERYTHROCYTE [DISTWIDTH] IN BLOOD: 12.9 % (ref 11.5–15.5)
GLUCOSE SERPL-MCNC: 110 MG/DL (ref 74–99)
HCT VFR BLD AUTO: 41.2 % (ref 39–53)
HGB BLD-MCNC: 13.7 GM/DL (ref 13–17.5)
LYMPHOCYTES # SPEC AUTO: 2.2 K/UL (ref 1–4.8)
LYMPHOCYTES NFR SPEC AUTO: 23 %
MCH RBC QN AUTO: 32.6 PG (ref 25–35)
MCHC RBC AUTO-ENTMCNC: 33.3 G/DL (ref 31–37)
MCV RBC AUTO: 97.7 FL (ref 80–100)
MONOCYTES # BLD AUTO: 0.5 K/UL (ref 0–1)
MONOCYTES NFR BLD AUTO: 5 %
NEUTROPHILS # BLD AUTO: 6.7 K/UL (ref 1.3–7.7)
NEUTROPHILS NFR BLD AUTO: 68 %
PLATELET # BLD AUTO: 137 K/UL (ref 150–450)
POTASSIUM SERPL-SCNC: 4 MMOL/L (ref 3.5–5.1)
SODIUM SERPL-SCNC: 136 MMOL/L (ref 137–145)
WBC # BLD AUTO: 9.8 K/UL (ref 3.8–10.6)

## 2024-11-24 RX ADMIN — Medication SCH MG: at 09:25

## 2024-11-24 RX ADMIN — LISINOPRIL AND HYDROCHLOROTHIAZIDE SCH EACH: 12.5; 2 TABLET ORAL at 09:25

## 2024-11-24 RX ADMIN — ATORVASTATIN CALCIUM SCH MG: 40 TABLET, FILM COATED ORAL at 09:24

## 2024-11-24 RX ADMIN — ASPIRIN 81 MG CHEWABLE TABLET SCH MG: 81 TABLET CHEWABLE at 09:25
